# Patient Record
Sex: MALE | Race: WHITE | HISPANIC OR LATINO | Employment: FULL TIME | URBAN - METROPOLITAN AREA
[De-identification: names, ages, dates, MRNs, and addresses within clinical notes are randomized per-mention and may not be internally consistent; named-entity substitution may affect disease eponyms.]

---

## 2017-01-31 ENCOUNTER — TELEPHONE (OUTPATIENT)
Dept: FAMILY MEDICINE | Facility: CLINIC | Age: 27
End: 2017-01-31

## 2017-01-31 ENCOUNTER — OFFICE VISIT (OUTPATIENT)
Dept: FAMILY MEDICINE | Facility: CLINIC | Age: 27
End: 2017-01-31
Attending: FAMILY MEDICINE
Payer: COMMERCIAL

## 2017-01-31 VITALS
OXYGEN SATURATION: 96 % | WEIGHT: 271.19 LBS | HEIGHT: 73 IN | DIASTOLIC BLOOD PRESSURE: 82 MMHG | BODY MASS INDEX: 35.94 KG/M2 | SYSTOLIC BLOOD PRESSURE: 128 MMHG | HEART RATE: 90 BPM

## 2017-01-31 DIAGNOSIS — H65.112 ACUTE ALLERGIC OTITIS MEDIA OF LEFT EAR, RECURRENCE NOT SPECIFIED: ICD-10-CM

## 2017-01-31 DIAGNOSIS — J30.89 ALLERGIC RHINITIS DUE TO DUST MITE: ICD-10-CM

## 2017-01-31 DIAGNOSIS — J45.21 MILD INTERMITTENT ASTHMA WITH ACUTE EXACERBATION: Primary | ICD-10-CM

## 2017-01-31 PROCEDURE — 99214 OFFICE O/P EST MOD 30 MIN: CPT | Mod: S$GLB,,, | Performed by: FAMILY MEDICINE

## 2017-01-31 PROCEDURE — 99999 PR PBB SHADOW E&M-EST. PATIENT-LVL III: CPT | Mod: PBBFAC,,, | Performed by: FAMILY MEDICINE

## 2017-01-31 PROCEDURE — 1159F MED LIST DOCD IN RCRD: CPT | Mod: S$GLB,,, | Performed by: FAMILY MEDICINE

## 2017-01-31 RX ORDER — ALBUTEROL SULFATE 0.83 MG/ML
2.5 SOLUTION RESPIRATORY (INHALATION) EVERY 6 HOURS PRN
Qty: 75 ML | Refills: 2 | Status: SHIPPED | OUTPATIENT
Start: 2017-01-31 | End: 2018-01-31

## 2017-01-31 RX ORDER — METHYLPREDNISOLONE 4 MG/1
TABLET ORAL
Qty: 21 TABLET | Refills: 0 | Status: SHIPPED | OUTPATIENT
Start: 2017-01-31 | End: 2017-02-07 | Stop reason: ALTCHOICE

## 2017-01-31 RX ORDER — FLUTICASONE PROPIONATE 50 MCG
1 SPRAY, SUSPENSION (ML) NASAL DAILY
COMMUNITY

## 2017-01-31 RX ORDER — AMOXICILLIN AND CLAVULANATE POTASSIUM 875; 125 MG/1; MG/1
1 TABLET, FILM COATED ORAL 2 TIMES DAILY
Qty: 20 TABLET | Refills: 0 | Status: SHIPPED | OUTPATIENT
Start: 2017-01-31 | End: 2017-02-10

## 2017-01-31 RX ORDER — MONTELUKAST SODIUM 10 MG/1
10 TABLET ORAL NIGHTLY
Qty: 30 TABLET | Refills: 0 | Status: SHIPPED | OUTPATIENT
Start: 2017-01-31 | End: 2017-03-25 | Stop reason: SDUPTHER

## 2017-01-31 NOTE — TELEPHONE ENCOUNTER
The patient was referred to the Sleep Medicine Clinic, not the Sleep Lab.  A referral for a consultation was ordered, not a sleep study.  Please schedule patient to see a provider at the Sleep Clinic.

## 2017-01-31 NOTE — PROGRESS NOTES
Subjective:       Patient ID: Alan David Reyes is a 26 y.o. male.    Chief Complaint: Follow-up (discuss sleep clinic)    Asthma   He complains of chest tightness, cough and wheezing. There is no difficulty breathing, frequent throat clearing, hemoptysis, shortness of breath or sputum production. This is a recurrent problem. The current episode started 1 to 4 weeks ago. Asthma causes daytime symptoms frequently. Asthma causes nighttime symptoms frequently. The problem has been waxing and waning. The cough is productive of sputum, harsh and nocturnal. Associated symptoms include ear congestion, heartburn and postnasal drip. Pertinent negatives include no ear pain, fever, myalgias, rhinorrhea, sneezing, sore throat or trouble swallowing. His symptoms are aggravated by exercise. His symptoms are alleviated by beta-agonist. He reports moderate improvement on treatment. There are no known risk factors for lung disease. His past medical history is significant for asthma.      Patient Active Problem List   Diagnosis    Allergic rhinitis due to dust mite    Mild intermittent asthma       Current Outpatient Prescriptions:     acetaminophen (TYLENOL) 500 MG tablet, Take 2 tablets (1,000 mg total) by mouth every 8 (eight) hours as needed for Pain., Disp: 30 tablet, Rfl: 0    ALBUTEROL INHL, Inhale into the lungs., Disp: , Rfl:     fluticasone (FLONASE) 50 mcg/actuation nasal spray, 1 spray by Each Nare route once daily., Disp: , Rfl:     fluticasone-salmeterol 250-50 mcg/dose (ADVAIR) 250-50 mcg/dose diskus inhaler, Inhale 1 puff into the lungs 2 (two) times daily., Disp: , Rfl:     albuterol (PROVENTIL) 2.5 mg /3 mL (0.083 %) nebulizer solution, Take 3 mLs (2.5 mg total) by nebulization every 6 (six) hours as needed for Wheezing. Rescue, Disp: 75 mL, Rfl: 2    amoxicillin-clavulanate 875-125mg (AUGMENTIN) 875-125 mg per tablet, Take 1 tablet by mouth 2 (two) times daily., Disp: 20 tablet, Rfl: 0    methylPREDNISolone  (MEDROL DOSEPACK) 4 mg tablet, use as directed, Disp: 21 tablet, Rfl: 0    montelukast (SINGULAIR) 10 mg tablet, Take 1 tablet (10 mg total) by mouth every evening., Disp: 30 tablet, Rfl: 0    There have been no changes to the patient's past medical, surgical, family, or social histories.      Review of Systems   Constitutional: Negative for fever.   HENT: Positive for postnasal drip. Negative for ear pain, rhinorrhea, sneezing, sore throat and trouble swallowing.    Respiratory: Positive for cough and wheezing. Negative for hemoptysis, sputum production and shortness of breath.    Gastrointestinal: Positive for heartburn.   Musculoskeletal: Negative for myalgias.         He was referred to Sleep Clinic in the fall, but was never able to connect for an appointment.      Objective:      Physical Exam   Constitutional: He is oriented to person, place, and time. He appears well-developed and well-nourished. He is cooperative.   HENT:   Head: Normocephalic and atraumatic.   Right Ear: External ear normal. Tympanic membrane is erythematous. Tympanic membrane mobility is abnormal.   Left Ear: External ear normal. Tympanic membrane is erythematous and bulging. Tympanic membrane mobility is abnormal.   Nose: Mucosal edema and rhinorrhea present. Right sinus exhibits maxillary sinus tenderness and frontal sinus tenderness. Left sinus exhibits maxillary sinus tenderness and frontal sinus tenderness.   Mouth/Throat: Mucous membranes are normal. Posterior oropharyngeal erythema present. No oropharyngeal exudate.   Eyes: Conjunctivae are normal. No scleral icterus.   Neck: Neck supple. No JVD present. Carotid bruit is not present. No thyromegaly present.   Cardiovascular: Normal rate, regular rhythm, normal heart sounds and normal pulses.  Exam reveals no gallop and no friction rub.    No murmur heard.  Pulmonary/Chest: Effort normal. He has wheezes (mild end-expiraotry). He has no rhonchi. He has no rales.   Abdominal: Soft.  "Bowel sounds are normal. He exhibits no distension and no mass. There is no splenomegaly or hepatomegaly. There is no tenderness.   Musculoskeletal: Normal range of motion. He exhibits no edema or tenderness.   Lymphadenopathy:     He has no cervical adenopathy.     He has no axillary adenopathy.   Neurological: He is alert and oriented to person, place, and time. He has normal strength and normal reflexes. No cranial nerve deficit or sensory deficit. Coordination normal.   Skin: Skin is warm and dry.   Psychiatric: He has a normal mood and affect.   Vitals reviewed.        Patient's peak expiratory flow in the office today was 78.6% of predicted (500 LPM/636 LPM predicted).    Assessment:       1. Mild intermittent asthma with acute exacerbation    2. Allergic rhinitis due to dust mite    3. Acute allergic otitis media of left ear, recurrence not specified        Plan:       Provided patient with peak flow meter; set red, yellow, and green zones.  Discontinue loratadine with trial of montelukast.  For probable otitis media/sinusitis, we'll prescribe Augmentin.  Continue albuterol metered-dose inhaler and fluticasone/salmeterol Diskus inhaler.  Prescription for new mask/nebulizer supplies, as well as albuterol inhalation solution.  Medrol Dosepak.  Follow-up by phone/email in the next few days.    "This note will not be shared with the patient."  "

## 2017-01-31 NOTE — MR AVS SNAPSHOT
[address]  [address]  [facility] [address]  Phone: [phone]                  Alan David Reyes   2017 2:00 PM   Office Visit    Description:  Male : 1990   Provider:  Dameon Putnam Jr., MD   Department:  [facility]           Reason for Visit     Follow-up           Diagnoses this Visit        Comments    Mild intermittent asthma with acute exacerbation    -  Primary     Allergic rhinitis due to dust mite         Acute allergic otitis media of left ear, recurrence not specified                To Do List           Future Appointments        Provider Department Dept Phone    2017 1:00 PM Robert Tolbert MD [facility] - Otorhinolaryngology [phone]      Goals (5 Years of Data)     None       These Medications        Disp Refills Start End    montelukast (SINGULAIR) 10 mg tablet 30 tablet 0 2017 3/2/2017    Take 1 tablet (10 mg total) by mouth every evening. - Oral    Pharmacy: [facility]/pharmacy #[id number] - [place] [address] Ph #: [phone]       methylPREDNISolone (MEDROL DOSEPACK) 4 mg tablet 21 tablet 0 2017     use as directed    [id number]  [place] [address] Ph #: [phone]       albuterol (PROVENTIL) 2.5 mg /3 mL (0.083 %) nebulizer solution 75 mL 2 2017    Take 3 mLs (2.5 mg total) by nebulization every 6 (six) hours as needed for Wheezing. Rescue - Nebulization    Pharmacy: [facility]/pharmacy #[id number]  [place] [facility] Ph #: [phone]       amoxicillin-clavulanate 875-125mg (AUGMENTIN) 875-125 mg per tablet 20 tablet 0 2017 2/10/2017    Take 1 tablet by mouth 2 (two) times daily. - Oral    Pharmacy: [facility]/pharmacy #[id number] [place] [address] Ph #: [phone]         [facility]     [facility] On Call Nurse Care Line -  Assistance  Registered nurses in the [facility] On Call Center provide clinical  advisement, health education, appointment booking, and other advisory services.  Call for this free service at [phone].             Medications           Message regarding Medications     Verify the changes and/or additions to your medication regime listed below are the same as discussed with your clinician today.  If any of these changes or additions are incorrect, please notify your healthcare provider.        START taking these NEW medications        Refills    montelukast (SINGULAIR) 10 mg tablet 0    Sig: Take 1 tablet (10 mg total) by mouth every evening.    Class: Normal    Route: Oral    methylPREDNISolone (MEDROL DOSEPACK) 4 mg tablet 0    Sig: use as directed    Class: Normal    albuterol (PROVENTIL) 2.5 mg /3 mL (0.083 %) nebulizer solution 2    Sig: Take 3 mLs (2.5 mg total) by nebulization every 6 (six) hours as needed for Wheezing. Rescue    Class: Normal    Route: Nebulization    amoxicillin-clavulanate 875-125mg (AUGMENTIN) 875-125 mg per tablet 0    Sig: Take 1 tablet by mouth 2 (two) times daily.    Class: Normal    Route: Oral      STOP taking these medications     loratadine (CLARITIN) 10 mg tablet Take 10 mg by mouth once daily.           Verify that the below list of medications is an accurate representation of the medications you are currently taking.  If none reported, the list may be blank. If incorrect, please contact your healthcare provider. Carry this list with you in case of emergency.           Current Medications     acetaminophen (TYLENOL) 500 MG tablet Take 2 tablets (1,000 mg total) by mouth every 8 (eight) hours as needed for Pain.    ALBUTEROL INHL Inhale into the lungs.    fluticasone (FLONASE) 50 mcg/actuation nasal spray 1 spray by Each Nare route once daily.    fluticasone-salmeterol 250-50 mcg/dose (ADVAIR) 250-50 mcg/dose diskus inhaler Inhale 1 puff into the lungs 2 (two) times daily.    albuterol (PROVENTIL) 2.5 mg /3 mL (0.083 %) nebulizer solution Take 3 mLs (2.5  "mg total) by nebulization every 6 (six) hours as needed for Wheezing. Rescue    amoxicillin-clavulanate 875-125mg (AUGMENTIN) 875-125 mg per tablet Take 1 tablet by mouth 2 (two) times daily.    methylPREDNISolone (MEDROL DOSEPACK) 4 mg tablet use as directed    montelukast (SINGULAIR) 10 mg tablet Take 1 tablet (10 mg total) by mouth every evening.           Clinical Reference Information           Vital Signs - Last Recorded  Most recent update: 1/31/2017  2:28 PM by Dameon Putnam Jr., MD    BP Pulse Ht Wt SpO2 BMI    128/82 (BP Location: Right arm, Patient Position: Sitting, BP Method: Manual) 90 6' 0.5" (1.842 m) 123 kg (271 lb 3.2 oz) 96% 36.28 kg/m2      Blood Pressure          Most Recent Value    BP  128/82      Allergies as of 1/31/2017     No Known Allergies      Immunizations Administered on Date of Encounter - 1/31/2017     None      Orders Placed During Today's Visit      Normal Orders This Visit    NEBULIZER KIT (SUPPLIES) FOR HOME USE       "

## 2017-01-31 NOTE — TELEPHONE ENCOUNTER
----- Message from Darleen Agrawal sent at 1/31/2017  2:50 PM CST -----  Contact: Darleen  I contact the Authorization Dept. We see the referral but in order for the pt to have a Study you will have to enter an order.

## 2017-02-01 ENCOUNTER — OFFICE VISIT (OUTPATIENT)
Dept: SLEEP MEDICINE | Facility: CLINIC | Age: 27
End: 2017-02-01
Payer: COMMERCIAL

## 2017-02-01 VITALS
DIASTOLIC BLOOD PRESSURE: 101 MMHG | SYSTOLIC BLOOD PRESSURE: 157 MMHG | WEIGHT: 275.81 LBS | HEIGHT: 72 IN | HEART RATE: 97 BPM | BODY MASS INDEX: 37.36 KG/M2

## 2017-02-01 DIAGNOSIS — J30.9 CHRONIC ALLERGIC RHINITIS: ICD-10-CM

## 2017-02-01 DIAGNOSIS — G47.30 SLEEP APNEA, UNSPECIFIED TYPE: Primary | ICD-10-CM

## 2017-02-01 PROCEDURE — 99999 PR PBB SHADOW E&M-EST. PATIENT-LVL III: CPT | Mod: PBBFAC,,, | Performed by: PSYCHIATRY & NEUROLOGY

## 2017-02-01 PROCEDURE — 99204 OFFICE O/P NEW MOD 45 MIN: CPT | Mod: S$GLB,,, | Performed by: PSYCHIATRY & NEUROLOGY

## 2017-02-01 NOTE — LETTER
February 1, 2017      Dameon Putnam Jr., MD  [address]  [address]  [facility] [address]           [facility]  [address]  [facility] [address]  Phone: [phone]          Patient: Alan David Reyes   MR Number: [id number]   YOB: 1990   Date of Visit: 2/1/2017       Dear Dr. Dameon Putnam Jr.:    Thank you for referring Alan Reyes to me for evaluation. Attached you will find relevant portions of my assessment and plan of care.    If you have questions, please do not hesitate to call me. I look forward to following Alan Reyes along with you.    Sincerely,    Junior Orozco MD    Enclosure  CC:  No Recipients    If you would like to receive this communication electronically, please contact [email or web address] or [phone] to request more information on [facility] Link access.    For providers and/or their staff who would like to refer a patient to [facility], please contact us through our one-stop-shop provider referral line, [facility], at [phone].    If you feel you have received this communication in error or would no longer like to receive these types of communications, please e-mail [email or web address]
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
## 2017-02-01 NOTE — MR AVS SNAPSHOT
[facility]  [address]  [facility] [address]  Phone: [phone]                  Alan David Reyes   2017 10:40 AM   Office Visit    Description:  Male : 1990   Provider:  Junior Orozco MD   Department:  [facility]           Reason for Visit     Snoring     Fatigue           Diagnoses this Visit        Comments    Sleep apnea, unspecified type    -  Primary     Chronic allergic rhinitis                To Do List           Future Appointments        Provider Department Dept Phone    2017 1:00 PM Robert Tolbert MD [facility] - Otorhinolaryngology [phone]      Goals (5 Years of Data)     None      Follow-Up and Disposition     Return after sleep study.      [facility] On Call     [facility] On Call Nurse Care Line -  Assistance  Registered nurses in the [facility] On Call Center provide clinical advisement, health education, appointment booking, and other advisory services.  Call for this free service at [phone].             Medications           Message regarding Medications     Verify the changes and/or additions to your medication regime listed below are the same as discussed with your clinician today.  If any of these changes or additions are incorrect, please notify your healthcare provider.             Verify that the below list of medications is an accurate representation of the medications you are currently taking.  If none reported, the list may be blank. If incorrect, please contact your healthcare provider. Carry this list with you in case of emergency.           Current Medications     acetaminophen (TYLENOL) 500 MG tablet Take 2 tablets (1,000 mg total) by mouth every 8 (eight) hours as needed for Pain.    albuterol (PROVENTIL) 2.5 mg /3 mL (0.083 %) nebulizer solution Take 3 mLs (2.5 mg total) by nebulization every 6 (six) hours as needed for Wheezing. Rescue    ALBUTEROL INHL Inhale into the lungs.    amoxicillin-clavulanate  875-125mg (AUGMENTIN) 875-125 mg per tablet Take 1 tablet by mouth 2 (two) times daily.    fluticasone (FLONASE) 50 mcg/actuation nasal spray 1 spray by Each Nare route once daily.    fluticasone-salmeterol 250-50 mcg/dose (ADVAIR) 250-50 mcg/dose diskus inhaler Inhale 1 puff into the lungs 2 (two) times daily.    methylPREDNISolone (MEDROL DOSEPACK) 4 mg tablet use as directed    montelukast (SINGULAIR) 10 mg tablet Take 1 tablet (10 mg total) by mouth every evening.           Clinical Reference Information           Vital Signs - Last Recorded  Most recent update: 2/1/2017 11:15 AM by Myrna Peacock MA    BP Pulse Ht Wt BMI    (!) 157/101 97 6' (1.829 m) 125.1 kg (275 lb 12.7 oz) 37.4 kg/m2      Blood Pressure          Most Recent Value    BP  (!)  157/101      Allergies as of 2/1/2017     No Known Allergies      Immunizations Administered on Date of Encounter - 2/1/2017     None      Orders Placed During Today's Visit     Future Labs/Procedures Expected by Expires    Home Sleep Studies  As directed 2/1/2018      Instructions    Patient is candidate for home sleep testing.  Set up testing with [facility].  Scheduled by [facility]    1. Bárbara/Danny will contact you to schedule your sleep study [phone] (ext 1)  2. Sleep Lab is located in the [facility], take [place] elevator to 7th floor; You will see sign for [facility]

## 2017-02-01 NOTE — PATIENT INSTRUCTIONS
Patient is candidate for home sleep testing.  Set up testing with [facility].  Scheduled by [facility] [place], operated by [facility]    1. Bárbara/Danny will contact you to schedule your sleep study [phone] (ext 1)  2. Sleep Lab is located in the [facility], take [place] elevator to 7th floor; You will see sign for [facility]

## 2017-02-07 ENCOUNTER — OFFICE VISIT (OUTPATIENT)
Dept: OTOLARYNGOLOGY | Facility: CLINIC | Age: 27
End: 2017-02-07
Payer: COMMERCIAL

## 2017-02-07 VITALS
HEIGHT: 73 IN | WEIGHT: 270.5 LBS | SYSTOLIC BLOOD PRESSURE: 148 MMHG | HEART RATE: 84 BPM | DIASTOLIC BLOOD PRESSURE: 103 MMHG | BODY MASS INDEX: 35.85 KG/M2

## 2017-02-07 DIAGNOSIS — H69.93 EUSTACHIAN TUBE DYSFUNCTION, BILATERAL: Primary | ICD-10-CM

## 2017-02-07 DIAGNOSIS — J34.2 DEVIATED NASAL SEPTUM: ICD-10-CM

## 2017-02-07 DIAGNOSIS — J34.3 NASAL TURBINATE HYPERTROPHY: ICD-10-CM

## 2017-02-07 DIAGNOSIS — K21.9 GASTROESOPHAGEAL REFLUX DISEASE, ESOPHAGITIS PRESENCE NOT SPECIFIED: ICD-10-CM

## 2017-02-07 DIAGNOSIS — J35.2 ADENOID HYPERTROPHY: ICD-10-CM

## 2017-02-07 DIAGNOSIS — J30.89 PERENNIAL ALLERGIC RHINITIS, UNSPECIFIED ALLERGIC RHINITIS TRIGGER: ICD-10-CM

## 2017-02-07 PROCEDURE — 99204 OFFICE O/P NEW MOD 45 MIN: CPT | Mod: 25,S$GLB,, | Performed by: OTOLARYNGOLOGY

## 2017-02-07 PROCEDURE — 31231 NASAL ENDOSCOPY DX: CPT | Mod: S$GLB,,, | Performed by: OTOLARYNGOLOGY

## 2017-02-07 PROCEDURE — 99999 PR PBB SHADOW E&M-EST. PATIENT-LVL III: CPT | Mod: PBBFAC,,, | Performed by: OTOLARYNGOLOGY

## 2017-02-07 NOTE — PROCEDURES
Nasal/sinus endoscopy  Date/Time: 2/7/2017 3:30 PM  Performed by: KARLA ABREU  Authorized by: KARLA ABREU     Consent Done?:  Yes (Verbal)  Anesthesia:     Local anesthetic:  Lidocaine 4% and Edy-Synephrine 1/2%    Patient tolerance:  Patient tolerated the procedure well with no immediate complications  Nose:     Procedure Performed:  Nasal Endoscopy  External:      No external nasal deformity  Intranasal:      Mucosa no polyps     Mucosa ulcers not present     No mucosa lesions present     Enlarged turbinates     Septum gross deformity  Nasopharynx:      No mucosa lesions     Adenoids present     Posterior choanae patent     Eustachian tube not patent     Marked rightward septal deviation.  3+ adenoids with encroachment on posterior ET torus.  Mucopurulent exudate in nasopharynx.
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
## 2017-02-08 ENCOUNTER — TELEPHONE (OUTPATIENT)
Dept: SLEEP MEDICINE | Facility: OTHER | Age: 27
End: 2017-02-08

## 2017-02-09 ENCOUNTER — TELEPHONE (OUTPATIENT)
Dept: SLEEP MEDICINE | Facility: OTHER | Age: 27
End: 2017-02-09

## 2017-02-13 DIAGNOSIS — H69.93 ETD (EUSTACHIAN TUBE DYSFUNCTION), BILATERAL: Primary | ICD-10-CM

## 2017-02-13 DIAGNOSIS — K21.9 GASTROESOPHAGEAL REFLUX DISEASE, ESOPHAGITIS PRESENCE NOT SPECIFIED: ICD-10-CM

## 2017-02-13 DIAGNOSIS — J35.2 ADENOID HYPERTROPHY: ICD-10-CM

## 2017-02-13 DIAGNOSIS — J34.3 NASAL TURBINATE HYPERTROPHY: ICD-10-CM

## 2017-02-13 DIAGNOSIS — J30.89 PERENNIAL ALLERGIC RHINITIS, UNSPECIFIED ALLERGIC RHINITIS TRIGGER: ICD-10-CM

## 2017-02-13 DIAGNOSIS — J34.2 DEVIATED NASAL SEPTUM: ICD-10-CM

## 2017-03-08 ENCOUNTER — TELEPHONE (OUTPATIENT)
Dept: SLEEP MEDICINE | Facility: OTHER | Age: 27
End: 2017-03-08

## 2017-03-09 ENCOUNTER — HOSPITAL ENCOUNTER (OUTPATIENT)
Dept: SLEEP MEDICINE | Facility: OTHER | Age: 27
Discharge: HOME | End: 2017-03-09
Attending: PSYCHIATRY & NEUROLOGY
Payer: COMMERCIAL

## 2017-03-09 DIAGNOSIS — G47.33 OSA (OBSTRUCTIVE SLEEP APNEA): ICD-10-CM

## 2017-03-09 DIAGNOSIS — G47.30 SLEEP APNEA, UNSPECIFIED TYPE: ICD-10-CM

## 2017-03-09 PROCEDURE — 95800 SLP STDY UNATTENDED: CPT | Mod: 26,,, | Performed by: PSYCHIATRY & NEUROLOGY

## 2017-03-09 PROCEDURE — 95800 SLP STDY UNATTENDED: CPT

## 2017-03-20 ENCOUNTER — TELEPHONE (OUTPATIENT)
Dept: SLEEP MEDICINE | Facility: OTHER | Age: 27
End: 2017-03-20

## 2017-03-22 ENCOUNTER — OFFICE VISIT (OUTPATIENT)
Dept: SLEEP MEDICINE | Facility: CLINIC | Age: 27
End: 2017-03-22
Payer: COMMERCIAL

## 2017-03-22 VITALS
DIASTOLIC BLOOD PRESSURE: 90 MMHG | HEIGHT: 73 IN | BODY MASS INDEX: 35.76 KG/M2 | SYSTOLIC BLOOD PRESSURE: 142 MMHG | HEART RATE: 84 BPM | WEIGHT: 269.81 LBS

## 2017-03-22 DIAGNOSIS — G47.33 OSA (OBSTRUCTIVE SLEEP APNEA): Primary | ICD-10-CM

## 2017-03-22 DIAGNOSIS — R09.81 CHRONIC NASAL CONGESTION: ICD-10-CM

## 2017-03-22 PROCEDURE — 99999 PR PBB SHADOW E&M-EST. PATIENT-LVL III: CPT | Mod: PBBFAC,,, | Performed by: NURSE PRACTITIONER

## 2017-03-22 PROCEDURE — 99214 OFFICE O/P EST MOD 30 MIN: CPT | Mod: S$GLB,,, | Performed by: NURSE PRACTITIONER

## 2017-03-22 PROCEDURE — 1160F RVW MEDS BY RX/DR IN RCRD: CPT | Mod: S$GLB,,, | Performed by: NURSE PRACTITIONER

## 2017-03-22 RX ORDER — MONTELUKAST SODIUM 10 MG/1
10 TABLET ORAL NIGHTLY
COMMUNITY
End: 2017-03-26 | Stop reason: SDUPTHER
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
## 2017-03-22 NOTE — MR AVS SNAPSHOT
[facility]  [address]  [facility] [address]  Phone: [phone]                  Alan David Reyes   3/22/2017 8:40 AM   Office Visit    Description:  Male : 1990   Provider:  Viry Rutherford NP   Department:  [facility]           Diagnoses this Visit        Comments    ALEX (obstructive sleep apnea)    -  Primary            To Do List           Future Appointments        Provider Department Dept Phone    2017 2:30 PM Robert Tolbert MD [facility] - Otorhinolaryngology [phone]    2017 8:40 AM Viry Rutherford NP [facility] [phone]      Your Future Surgeries/Procedures     Mar 27, 2017   Surgery with Robert Tolbert MD   [facility]    [address] [facility] [address] [phone]              Goals (5 Years of Data)     None      Follow-Up and Disposition     Return in about 10 weeks (around 2017) for adherence monitoring after auto CPAP set up .      [facility]     [facility] -  Assistance  Registered nurses in the [facility] provide clinical advisement, health education, appointment booking, and other advisory services.  Call for this free service at [phone].             Medications           Message regarding Medications     Verify the changes and/or additions to your medication regime listed below are the same as discussed with your clinician today.  If any of these changes or additions are incorrect, please notify your healthcare provider.             Verify that the below list of medications is an accurate representation of the medications you are currently taking.  If none reported, the list may be blank. If incorrect, please contact your healthcare provider. Carry this list with you in case of emergency.           Current Medications     acetaminophen (TYLENOL) 500 MG tablet Take 2 tablets  "(1,000 mg total) by mouth every 8 (eight) hours as needed for Pain.    albuterol (PROVENTIL) 2.5 mg /3 mL (0.083 %) nebulizer solution Take 3 mLs (2.5 mg total) by nebulization every 6 (six) hours as needed for Wheezing. Rescue    ALBUTEROL INHL Inhale into the lungs.    fluticasone (FLONASE) 50 mcg/actuation nasal spray 1 spray by Each Nare route once daily.    fluticasone-salmeterol 250-50 mcg/dose (ADVAIR) 250-50 mcg/dose diskus inhaler Inhale 1 puff into the lungs 2 (two) times daily.    montelukast (SINGULAIR) 10 mg tablet Take 10 mg by mouth every evening.           Clinical Reference Information           Your Vitals Were     BP Pulse Height Weight BMI    142/90 84 6' 1" (1.854 m) 122.4 kg (269 lb 13.5 oz) 35.6 kg/m2      Blood Pressure          Most Recent Value    BP  (!)  142/90      Allergies as of 3/22/2017     No Known Allergies      Immunizations Administered on Date of Encounter - 3/22/2017     None      Orders Placed During Today's Visit      Normal Orders This Visit    CPAP FOR HOME USE       Language Assistance Services     ATTENTION: Language assistance services are available, free of charge. Please call [phone].      ATENCIÓN: Si arala paolo, tiene a serna disposición servicios gratuitos de asistencia lingüística. Llame al [phone].     [facility] Ý: N?u b?n nói Ti?ng Vi?t, có các d?ch v? h? tr? ngôn ng? mi?n phí dành cho b?n. G?i s? [phone].         [facility] complies with applicable Federal civil rights laws and does not discriminate on the basis of race, color, national origin, age, disability, or sex.        "

## 2017-03-24 ENCOUNTER — TELEPHONE (OUTPATIENT)
Dept: OTOLARYNGOLOGY | Facility: CLINIC | Age: 27
End: 2017-03-24

## 2017-03-24 NOTE — TELEPHONE ENCOUNTER
Left message along with contact information of surgery arrival time for Monday 03/27/17 with Dr. Tolbert of 10:30 AM.

## 2017-03-26 RX ORDER — MONTELUKAST SODIUM 10 MG/1
TABLET ORAL
Qty: 30 TABLET | Refills: 5 | Status: SHIPPED | OUTPATIENT
Start: 2017-03-26 | End: 2017-08-03 | Stop reason: SDUPTHER

## 2017-03-27 ENCOUNTER — ANESTHESIA (OUTPATIENT)
Dept: SURGERY | Facility: HOSPITAL | Age: 27
End: 2017-03-27
Payer: COMMERCIAL

## 2017-03-27 ENCOUNTER — SURGERY (OUTPATIENT)
Age: 27
End: 2017-03-27

## 2017-03-27 ENCOUNTER — ANESTHESIA EVENT (OUTPATIENT)
Dept: SURGERY | Facility: HOSPITAL | Age: 27
End: 2017-03-27
Payer: COMMERCIAL

## 2017-03-27 ENCOUNTER — HOSPITAL ENCOUNTER (OUTPATIENT)
Facility: HOSPITAL | Age: 27
Discharge: HOME | End: 2017-03-27
Attending: OTOLARYNGOLOGY | Admitting: OTOLARYNGOLOGY
Payer: COMMERCIAL

## 2017-03-27 VITALS
OXYGEN SATURATION: 99 % | HEART RATE: 98 BPM | SYSTOLIC BLOOD PRESSURE: 142 MMHG | DIASTOLIC BLOOD PRESSURE: 85 MMHG | TEMPERATURE: 99 F | HEIGHT: 73 IN | BODY MASS INDEX: 35.85 KG/M2 | RESPIRATION RATE: 18 BRPM | WEIGHT: 270.5 LBS

## 2017-03-27 DIAGNOSIS — J35.2 ADENOID HYPERTROPHY: Primary | ICD-10-CM

## 2017-03-27 DIAGNOSIS — G47.33 OSA (OBSTRUCTIVE SLEEP APNEA): ICD-10-CM

## 2017-03-27 DIAGNOSIS — R09.81 CHRONIC NASAL CONGESTION: ICD-10-CM

## 2017-03-27 PROCEDURE — 27000221 HC OXYGEN, UP TO 24 HOURS

## 2017-03-27 PROCEDURE — 71000033 HC RECOVERY, INTIAL HOUR: Performed by: OTOLARYNGOLOGY

## 2017-03-27 PROCEDURE — 36000708 HC OR TIME LEV III 1ST 15 MIN: Performed by: OTOLARYNGOLOGY

## 2017-03-27 PROCEDURE — 71000015 HC POSTOP RECOV 1ST HR: Performed by: OTOLARYNGOLOGY

## 2017-03-27 PROCEDURE — 71000039 HC RECOVERY, EACH ADD'L HOUR: Performed by: OTOLARYNGOLOGY

## 2017-03-27 PROCEDURE — D9220A PRA ANESTHESIA: Mod: ANES,,, | Performed by: ANESTHESIOLOGY

## 2017-03-27 PROCEDURE — D9220A PRA ANESTHESIA: Mod: CRNA,,, | Performed by: NURSE ANESTHETIST, CERTIFIED REGISTERED

## 2017-03-27 PROCEDURE — 63600175 PHARM REV CODE 636 W HCPCS: Performed by: ANESTHESIOLOGY

## 2017-03-27 PROCEDURE — 31237 NSL/SINS NDSC SURG BX POLYPC: CPT | Mod: 50,51,, | Performed by: OTOLARYNGOLOGY

## 2017-03-27 PROCEDURE — 25000003 PHARM REV CODE 250: Performed by: NURSE ANESTHETIST, CERTIFIED REGISTERED

## 2017-03-27 PROCEDURE — 63600175 PHARM REV CODE 636 W HCPCS: Performed by: NURSE ANESTHETIST, CERTIFIED REGISTERED

## 2017-03-27 PROCEDURE — 25000003 PHARM REV CODE 250: Performed by: OTOLARYNGOLOGY

## 2017-03-27 PROCEDURE — 27201423 OPTIME MED/SURG SUP & DEVICES STERILE SUPPLY: Performed by: OTOLARYNGOLOGY

## 2017-03-27 PROCEDURE — 42831 REMOVAL OF ADENOIDS: CPT | Mod: 51,,, | Performed by: OTOLARYNGOLOGY

## 2017-03-27 PROCEDURE — 25000003 PHARM REV CODE 250: Performed by: STUDENT IN AN ORGANIZED HEALTH CARE EDUCATION/TRAINING PROGRAM

## 2017-03-27 PROCEDURE — 30520 REPAIR OF NASAL SEPTUM: CPT | Mod: ,,, | Performed by: OTOLARYNGOLOGY

## 2017-03-27 PROCEDURE — 63600175 PHARM REV CODE 636 W HCPCS: Performed by: OTOLARYNGOLOGY

## 2017-03-27 PROCEDURE — 36000709 HC OR TIME LEV III EA ADD 15 MIN: Performed by: OTOLARYNGOLOGY

## 2017-03-27 PROCEDURE — 37000008 HC ANESTHESIA 1ST 15 MINUTES: Performed by: OTOLARYNGOLOGY

## 2017-03-27 PROCEDURE — 37000009 HC ANESTHESIA EA ADD 15 MINS: Performed by: OTOLARYNGOLOGY

## 2017-03-27 PROCEDURE — 71000016 HC POSTOP RECOV ADDL HR: Performed by: OTOLARYNGOLOGY

## 2017-03-27 PROCEDURE — 30140 RESECT INFERIOR TURBINATE: CPT | Mod: 50,51,, | Performed by: OTOLARYNGOLOGY

## 2017-03-27 RX ORDER — ONDANSETRON 2 MG/ML
4 INJECTION INTRAMUSCULAR; INTRAVENOUS ONCE AS NEEDED
Status: DISCONTINUED | OUTPATIENT
Start: 2017-03-27 | End: 2017-03-27 | Stop reason: HOSPADM

## 2017-03-27 RX ORDER — LIDOCAINE HCL/PF 100 MG/5ML
SYRINGE (ML) INTRAVENOUS
Status: DISCONTINUED | OUTPATIENT
Start: 2017-03-27 | End: 2017-03-27

## 2017-03-27 RX ORDER — LIDOCAINE HCL/EPINEPHRINE/PF 2%-1:200K
VIAL (ML) INJECTION
Status: DISCONTINUED | OUTPATIENT
Start: 2017-03-27 | End: 2017-03-27 | Stop reason: HOSPADM

## 2017-03-27 RX ORDER — ACETAMINOPHEN 10 MG/ML
INJECTION, SOLUTION INTRAVENOUS
Status: DISCONTINUED | OUTPATIENT
Start: 2017-03-27 | End: 2017-03-27

## 2017-03-27 RX ORDER — OXYCODONE AND ACETAMINOPHEN 5; 325 MG/1; MG/1
1 TABLET ORAL EVERY 4 HOURS PRN
Status: DISCONTINUED | OUTPATIENT
Start: 2017-03-27 | End: 2017-03-27 | Stop reason: HOSPADM

## 2017-03-27 RX ORDER — HYDROMORPHONE HYDROCHLORIDE 1 MG/ML
0.2 INJECTION, SOLUTION INTRAMUSCULAR; INTRAVENOUS; SUBCUTANEOUS EVERY 5 MIN PRN
Status: DISCONTINUED | OUTPATIENT
Start: 2017-03-27 | End: 2017-03-27 | Stop reason: HOSPADM

## 2017-03-27 RX ORDER — LIDOCAINE HYDROCHLORIDE 10 MG/ML
1 INJECTION, SOLUTION EPIDURAL; INFILTRATION; INTRACAUDAL; PERINEURAL ONCE
Status: DISCONTINUED | OUTPATIENT
Start: 2017-03-27 | End: 2017-03-27 | Stop reason: HOSPADM

## 2017-03-27 RX ORDER — DEXAMETHASONE SODIUM PHOSPHATE 4 MG/ML
INJECTION, SOLUTION INTRA-ARTICULAR; INTRALESIONAL; INTRAMUSCULAR; INTRAVENOUS; SOFT TISSUE
Status: DISCONTINUED | OUTPATIENT
Start: 2017-03-27 | End: 2017-03-27

## 2017-03-27 RX ORDER — PROPOFOL 10 MG/ML
VIAL (ML) INTRAVENOUS
Status: DISCONTINUED | OUTPATIENT
Start: 2017-03-27 | End: 2017-03-27

## 2017-03-27 RX ORDER — EPINEPHRINE 1 MG/ML
INJECTION, SOLUTION INTRACARDIAC; INTRAMUSCULAR; INTRAVENOUS; SUBCUTANEOUS
Status: DISCONTINUED | OUTPATIENT
Start: 2017-03-27 | End: 2017-03-27 | Stop reason: HOSPADM

## 2017-03-27 RX ORDER — KETAMINE HYDROCHLORIDE 100 MG/ML
INJECTION, SOLUTION INTRAMUSCULAR; INTRAVENOUS
Status: DISCONTINUED | OUTPATIENT
Start: 2017-03-27 | End: 2017-03-27

## 2017-03-27 RX ORDER — NEOSTIGMINE METHYLSULFATE 1 MG/ML
INJECTION, SOLUTION INTRAVENOUS
Status: DISCONTINUED | OUTPATIENT
Start: 2017-03-27 | End: 2017-03-27

## 2017-03-27 RX ORDER — ONDANSETRON 2 MG/ML
INJECTION INTRAMUSCULAR; INTRAVENOUS
Status: DISCONTINUED | OUTPATIENT
Start: 2017-03-27 | End: 2017-03-27

## 2017-03-27 RX ORDER — CEFAZOLIN SODIUM 1 G/3ML
INJECTION, POWDER, FOR SOLUTION INTRAMUSCULAR; INTRAVENOUS
Status: DISCONTINUED | OUTPATIENT
Start: 2017-03-27 | End: 2017-03-27

## 2017-03-27 RX ORDER — MIDAZOLAM HYDROCHLORIDE 1 MG/ML
INJECTION INTRAMUSCULAR; INTRAVENOUS
Status: DISCONTINUED | OUTPATIENT
Start: 2017-03-27 | End: 2017-03-27

## 2017-03-27 RX ORDER — SODIUM CHLORIDE 9 MG/ML
INJECTION, SOLUTION INTRAVENOUS CONTINUOUS PRN
Status: DISCONTINUED | OUTPATIENT
Start: 2017-03-27 | End: 2017-03-27

## 2017-03-27 RX ORDER — FENTANYL CITRATE 50 UG/ML
INJECTION, SOLUTION INTRAMUSCULAR; INTRAVENOUS
Status: DISCONTINUED | OUTPATIENT
Start: 2017-03-27 | End: 2017-03-27

## 2017-03-27 RX ORDER — OXYCODONE AND ACETAMINOPHEN 5; 325 MG/1; MG/1
1 TABLET ORAL EVERY 6 HOURS PRN
Qty: 30 TABLET | Refills: 0 | Status: SHIPPED | OUTPATIENT
Start: 2017-03-27 | End: 2017-04-04 | Stop reason: SDUPTHER

## 2017-03-27 RX ORDER — MUPIROCIN 20 MG/G
OINTMENT TOPICAL
Status: DISCONTINUED | OUTPATIENT
Start: 2017-03-27 | End: 2017-03-27 | Stop reason: HOSPADM

## 2017-03-27 RX ORDER — DEXAMETHASONE SODIUM PHOSPHATE 4 MG/ML
12 INJECTION, SOLUTION INTRA-ARTICULAR; INTRALESIONAL; INTRAMUSCULAR; INTRAVENOUS; SOFT TISSUE
Status: DISCONTINUED | OUTPATIENT
Start: 2017-03-27 | End: 2017-03-27 | Stop reason: HOSPADM

## 2017-03-27 RX ORDER — CEPHALEXIN 500 MG/1
500 CAPSULE ORAL EVERY 8 HOURS
Qty: 40 CAPSULE | Refills: 0 | Status: SHIPPED | OUTPATIENT
Start: 2017-03-27 | End: 2017-04-07

## 2017-03-27 RX ORDER — GLYCOPYRROLATE 0.2 MG/ML
INJECTION INTRAMUSCULAR; INTRAVENOUS
Status: DISCONTINUED | OUTPATIENT
Start: 2017-03-27 | End: 2017-03-27

## 2017-03-27 RX ORDER — ROCURONIUM BROMIDE 10 MG/ML
INJECTION, SOLUTION INTRAVENOUS
Status: DISCONTINUED | OUTPATIENT
Start: 2017-03-27 | End: 2017-03-27

## 2017-03-27 RX ADMIN — DEXMEDETOMIDINE HYDROCHLORIDE 8 MCG: 100 INJECTION, SOLUTION, CONCENTRATE INTRAVENOUS at 02:03

## 2017-03-27 RX ADMIN — DEXMEDETOMIDINE HYDROCHLORIDE 8 MCG: 100 INJECTION, SOLUTION, CONCENTRATE INTRAVENOUS at 03:03

## 2017-03-27 RX ADMIN — COCAINE HYDROCHLORIDE 4 ML: 40 SOLUTION TOPICAL at 02:03

## 2017-03-27 RX ADMIN — DEXMEDETOMIDINE HYDROCHLORIDE 8 MCG: 100 INJECTION, SOLUTION, CONCENTRATE INTRAVENOUS at 04:03

## 2017-03-27 RX ADMIN — PROPOFOL 200 MG: 10 INJECTION, EMULSION INTRAVENOUS at 02:03

## 2017-03-27 RX ADMIN — ONDANSETRON 4 MG: 2 INJECTION INTRAMUSCULAR; INTRAVENOUS at 04:03

## 2017-03-27 RX ADMIN — MIDAZOLAM HYDROCHLORIDE 2 MG: 1 INJECTION, SOLUTION INTRAMUSCULAR; INTRAVENOUS at 02:03

## 2017-03-27 RX ADMIN — CEFAZOLIN 2 G: 1 INJECTION, POWDER, FOR SOLUTION INTRAVENOUS at 02:03

## 2017-03-27 RX ADMIN — FENTANYL CITRATE 50 MCG: 50 INJECTION, SOLUTION INTRAMUSCULAR; INTRAVENOUS at 02:03

## 2017-03-27 RX ADMIN — LIDOCAINE HYDROCHLORIDE AND EPINEPHRINE 15 ML: 20; 5 INJECTION, SOLUTION EPIDURAL; INFILTRATION; INTRACAUDAL; PERINEURAL at 02:03

## 2017-03-27 RX ADMIN — SODIUM CHLORIDE: 0.9 INJECTION, SOLUTION INTRAVENOUS at 01:03

## 2017-03-27 RX ADMIN — GLYCOPYRROLATE 0.6 MG: 0.2 INJECTION, SOLUTION INTRAMUSCULAR; INTRAVENOUS at 04:03

## 2017-03-27 RX ADMIN — HYDROMORPHONE HYDROCHLORIDE 0.2 MG: 1 INJECTION, SOLUTION INTRAMUSCULAR; INTRAVENOUS; SUBCUTANEOUS at 07:03

## 2017-03-27 RX ADMIN — KETAMINE HYDROCHLORIDE 25 MG: 100 INJECTION, SOLUTION, CONCENTRATE INTRAMUSCULAR; INTRAVENOUS at 02:03

## 2017-03-27 RX ADMIN — DEXAMETHASONE SODIUM PHOSPHATE 12 MG: 4 INJECTION, SOLUTION INTRAMUSCULAR; INTRAVENOUS at 02:03

## 2017-03-27 RX ADMIN — PROPOFOL 50 MG: 10 INJECTION, EMULSION INTRAVENOUS at 02:03

## 2017-03-27 RX ADMIN — GLYCOPYRROLATE 0.2 MG: 0.2 INJECTION, SOLUTION INTRAMUSCULAR; INTRAVENOUS at 02:03

## 2017-03-27 RX ADMIN — ROCURONIUM BROMIDE 10 MG: 10 INJECTION, SOLUTION INTRAVENOUS at 02:03

## 2017-03-27 RX ADMIN — THROMBIN, TOPICAL (BOVINE) 15000 UNITS: KIT at 02:03

## 2017-03-27 RX ADMIN — LIDOCAINE HYDROCHLORIDE 100 MG: 20 INJECTION, SOLUTION INTRAVENOUS at 02:03

## 2017-03-27 RX ADMIN — EPINEPHRINE 2 MG: 1 INJECTION, SOLUTION INTRAMUSCULAR; SUBCUTANEOUS at 02:03

## 2017-03-27 RX ADMIN — DEXMEDETOMIDINE HYDROCHLORIDE 4 MCG: 100 INJECTION, SOLUTION, CONCENTRATE INTRAVENOUS at 02:03

## 2017-03-27 RX ADMIN — MUPIROCIN 1 TUBE: 20 OINTMENT TOPICAL at 02:03

## 2017-03-27 RX ADMIN — OXYCODONE HYDROCHLORIDE AND ACETAMINOPHEN 1 TABLET: 5; 325 TABLET ORAL at 05:03

## 2017-03-27 RX ADMIN — KETAMINE HYDROCHLORIDE 20 MG: 100 INJECTION, SOLUTION, CONCENTRATE INTRAMUSCULAR; INTRAVENOUS at 03:03

## 2017-03-27 RX ADMIN — HYDROMORPHONE HYDROCHLORIDE 0.2 MG: 1 INJECTION, SOLUTION INTRAMUSCULAR; INTRAVENOUS; SUBCUTANEOUS at 06:03

## 2017-03-27 RX ADMIN — ROCURONIUM BROMIDE 40 MG: 10 INJECTION, SOLUTION INTRAVENOUS at 02:03

## 2017-03-27 RX ADMIN — FENTANYL CITRATE 100 MCG: 50 INJECTION, SOLUTION INTRAMUSCULAR; INTRAVENOUS at 02:03

## 2017-03-27 RX ADMIN — NEOSTIGMINE METHYLSULFATE 4 MG: 1 INJECTION INTRAVENOUS at 04:03

## 2017-03-27 RX ADMIN — ACETAMINOPHEN 1000 MG: 10 INJECTION, SOLUTION INTRAVENOUS at 04:03

## 2017-03-27 RX ADMIN — SODIUM CHLORIDE, SODIUM GLUCONATE, SODIUM ACETATE, POTASSIUM CHLORIDE, MAGNESIUM CHLORIDE, SODIUM PHOSPHATE, DIBASIC, AND POTASSIUM PHOSPHATE: .53; .5; .37; .037; .03; .012; .00082 INJECTION, SOLUTION INTRAVENOUS at 04:03

## 2017-03-27 RX ADMIN — ROCURONIUM BROMIDE 5 MG: 10 INJECTION, SOLUTION INTRAVENOUS at 03:03

## 2017-03-27 NOTE — BRIEF OP NOTE
[facility]  Brief Operative Note     SUMMARY     Surgery Date: 3/27/2017     Surgeon(s) and Role:     * David Stock MD - Resident - Assisting     * Robert Tolbert MD - Primary     * Robert Tolbert MD - Primary        Pre-op Diagnosis:  Deviated nasal septum [J34.2]  Adenoid hypertrophy [J35.2]  Nasal turbinate hypertrophy [J34.3]  ETD (eustachian tube dysfunction), bilateral [H69.83]  Gastroesophageal reflux disease, esophagitis presence not specified [K21.9]  Perennial allergic rhinitis, unspecified allergic rhinitis trigger [J30.89]    Post-op Diagnosis:  Post-Op Diagnosis Codes:     * Deviated nasal septum [J34.2]     * Adenoid hypertrophy [J35.2]     * Nasal turbinate hypertrophy [J34.3]     * ETD (eustachian tube dysfunction), bilateral [H69.83]     * Gastroesophageal reflux disease, esophagitis presence not specified [K21.9]     * Perennial allergic rhinitis, unspecified allergic rhinitis trigger [J30.89]    Procedure(s) (LRB):  SEPTOPLASTY (Bilateral)  REDUCTION-TURBINATE (Bilateral)  SINUS SURGERY FUNCTIONAL ENDOSCOPIC (Bilateral)  ADENOIDECTOMY (Bilateral)    Anesthesia: General    Description of the findings of the procedure: septoplasty, inferior turbinate reduction, denoidectomy    Findings/Key Components: see op report    Estimated Blood Loss: 65 ml         Specimens:   Specimen     None          Discharge Note    SUMMARY     Admit Date: 3/27/2017    Discharge Date and Time:  03/27/2017 4:44 PM    Hospital Course (synopsis of major diagnoses, care, treatment, and services provided during the course of the hospital stay): Pt has outpatient surgery and was awakened in the recovery room and discharged home in a stable condition. pts pain was controlled and tolerating a diet         Final Diagnosis: Post-Op Diagnosis Codes:     * Deviated nasal septum [J34.2]     * Adenoid hypertrophy [J35.2]     * Nasal turbinate hypertrophy [J34.3]     * ETD (eustachian tube  dysfunction), bilateral [H69.83]     * Gastroesophageal reflux disease, esophagitis presence not specified [K21.9]     * Perennial allergic rhinitis, unspecified allergic rhinitis trigger [J30.89]    Disposition: Home or Self Care    Follow Up/Patient Instructions:     Medications:  Reconciled Home Medications:   Current Discharge Medication List      START taking these medications    Details   cephALEXin (KEFLEX) 500 MG capsule Take 1 capsule (500 mg total) by mouth every 8 (eight) hours.  Qty: 40 capsule, Refills: 0      oxycodone-acetaminophen (PERCOCET) 5-325 mg per tablet Take 1 tablet by mouth every 6 (six) hours as needed for Pain.  Qty: 30 tablet, Refills: 0         CONTINUE these medications which have NOT CHANGED    Details   ALBUTEROL INHL Inhale into the lungs.      montelukast (SINGULAIR) 10 mg tablet TAKE 1 TABLET (10 MG TOTAL) BY MOUTH EVERY EVENING.  Qty: 30 tablet, Refills: 5      albuterol (PROVENTIL) 2.5 mg /3 mL (0.083 %) nebulizer solution Take 3 mLs (2.5 mg total) by nebulization every 6 (six) hours as needed for Wheezing. Rescue  Qty: 75 mL, Refills: 2      fluticasone (FLONASE) 50 mcg/actuation nasal spray 1 spray by Each Nare route once daily.      fluticasone-salmeterol 250-50 mcg/dose (ADVAIR) 250-50 mcg/dose diskus inhaler Inhale 1 puff into the lungs 2 (two) times daily.         STOP taking these medications       acetaminophen (TYLENOL) 500 MG tablet Comments:   Reason for Stopping:               Discharge Procedure Orders  Diet general     Lifting restrictions     Weight bearing restrictions (specify)     Other restrictions (specify):   Order Comments: INSTRUCTIONS TO FOLLOW AFTER SINUS SURGERY  DR. McCOUL  [facility]      Your first office visit with Dr. Tolbert after surgery should have been already scheduled.  If you dont know when it is, call Dr. John Garsia at [phone].    ACTIVITY:    Sleep on your back with the head of the bead elevated, up on 2-3 pillows, or in  a recliner for the first 3 to 5 days. This will help with swelling.     After surgery you may have a lot of nasal drainage. This is normal. Do not wipe, touch, or dab your nose. You may breathe through your nose if youre able but avoid inhaling forcibly. Let all drainage fall on your mustache dressing and change it as needed.    You may shower 24 hours after surgery.    If you use CPAP or BiPAP to sleep at night, you should wait at least 48 hours before resuming use.  Dr. Tolbert will advise you when it is safe to do this.    DRESSINGS:    Change the mustache dressing under your nose as needed. (If unsure what this dressing is or how to do this, ask your doctor or nurse before you leave the clinic/hospital.) You may have pinkish-red drainage for 2-3 days.    In certain cases you may have gauze packing inside your nostrils.  If so, these will turn from white to red from the drainage. This is normal so do not be alarmed. Do not touch or pull at the packing. The packing will be removed by your doctor at your first post-op visit.     You may also have a dissolvable stent or dissolvable sponge placed into the sinuses during surgery.  These usually do not need to be removed unless you are told otherwise by Dr. Tolbert.  You may notice small fragments of these items come out of your nose in the weeks following surgery.    MEDICATIONS:  After surgery, you are generally sent home with prescription for an antibiotic, a pain medication, and an anti-nausea medication.     Start your antibiotics when you get home from surgery and take them until they are all gone.  It is best to take them with food to prevent an upset stomach.    Most people need prescription pain medication for the first few days after surgery.  If you find that this is not necessary, you may use over-the-counter Tylenol (Acetaminophen) instead.    Avoid Aspirin, Motrin (Ibuprofen), Advil, Aleve and Vitamin E for 1 week before surgery and 1 week after surgery.  There are many other medications to avoid as well due to the fact they act as blood thinners.      Some people have problems with bowel movements after surgery. If you have NOT had a bowel movement 3-5 days after surgery, go to your local pharmacy and purchase an over the counter stool softener such as COLACE. You can also ask the pharmacist for his or her recommendation. If you still do not have a bowel movement after starting the softener, please call Dr. Lopez office.    You will need these over-the-counter medications after surgery:    Saline Sinus Rinse ([employer] brand):  You will use this to rinse out your nose and sinuses after surgery.  Begin doing this the day after surgery, unless instructed otherwise by Dr. Tolbert.  You should do this 2 times a day, following the instructions on the box.    Afrin (regular strength): Only use if you have nasal congestion or bleeding. Use 2 times per day for 3 days, stop for 1 day, continue 2 times per day for 3 days, then stop completely.  NOTE:  You may not need to do this at all.      RESTRICTED ACTIVITIES AFTER SURGERY:    Do NOT blow your nose for 2 weeks. If you have to sneeze or cough, do so with your mouth open.   AVOID all heavy lifting, straining or bending for 2 weeks.   AVOID any sexual activity for 2 weeks after surgery.  AVOID semi-contact sports or vigorous exercising for 3-4 weeks. Dr. Tolbert will let you know when you are cleared to resume exercise.  No Swimming for 3-4 weeks.  No Flying for 2 weeks.    Do NOT operate a motor vehicle or any type of heavy machinery within 24 hours of taking pain medication.  DO NOT smoke or be around smokers.  AVOID irritating substances such as dust, chalk, harsh chemicals, and allergic triggers.    DIET:    Avoid hot and spicy foods, or salty soups for 1 week after surgery.  Begin with bland foods the day after surgery and advance to your regular diet as tolerated.  It is not necessary to take only soft food unless you  are recovering from tonsil surgery.  Drink plenty of fluids (water is best).   Avoid alcoholic and caffeinated beverages for 1 week after surgery.     Call MD for:  increased confusion or weakness     Call MD for:  persistent dizziness, light-headedness, or visual disturbances     Call MD for:  worsening rash     Call MD for:  severe persistent headache     Call MD for:  difficulty breathing or increased cough     Call MD for:  severe uncontrolled pain     Call MD for:  redness, tenderness, or signs of infection (pain, swelling, redness, odor or green/yellow discharge around incision site)     Call MD for:  persistent nausea and vomiting or diarrhea     Call MD for:  temperature >100.4     Change dressing (specify)   Order Comments: Change moustache dressing as needed for bleeding       Follow-up Information     Follow up with Robert Tolbert MD On 4/4/2017.    Specialty:  Otolaryngology    Why:  For wound re-check    Contact information:    [address]  [facility] [address]  [phone]
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
## 2017-03-27 NOTE — PROGRESS NOTES
Notified Dr. Manley that patient is complaining of 7/10 pain upon arriving from PACU and I am unable to reach  to page Surgical Resident.  Dr. Manley stated that she would put in order for Dialudid IVP.  Will continue to monitor patient.

## 2017-03-27 NOTE — ANESTHESIA PREPROCEDURE EVALUATION
"                                                                                                             03/27/2017  Alan David Reyes is a 26 y.o., male.    [facility] Anesthesia Evaluation    I have reviewed the Patient Summary Reports.    I have reviewed the Nursing Notes.   I have reviewed the Medications.     Review of Systems  Anesthesia Hx:  No problems with previous Anesthesia Denies Hx of Anesthetic complications Pt states he "wakes up during anesthesia" previous anesthetics include local anesthesia for a small neck mass procedure and anesthesia for wisdom tooth removal with OMFS. History of prior surgery of interest to airway management or planning: Denies Family Hx of Anesthesia complications.   Denies Personal Hx of Anesthesia complications.   EENT/Dental:   Chronic sinusitis   Cardiovascular:  Cardiovascular Normal  Denies MI.      Pulmonary:   Asthma Sleep Apnea, CPAP    Renal/:  Renal/ Normal     Hepatic/GI:  Hepatic/GI Normal    Neurological:  Neurology Normal  Denies CVA. Denies Seizures.        Physical Exam  General:  Obesity    Airway/Jaw/Neck:  Airway Findings: Mouth Opening: Normal Tongue: Normal  General Airway Assessment: Adult  Mallampati: I  Improves to I with phonation.  TM Distance: Normal, at least 6 cm  Jaw/Neck Findings:  Micrognathia: Negative Neck ROM: Normal ROM      Dental:  Dental Findings: In tact   Chest/Lungs:  Chest/Lungs Findings: Clear to auscultation, Normal Respiratory Rate     Heart/Vascular:  Heart Findings: Rate: Normal  Rhythm: Regular Rhythm  Sounds: Normal  Heart murmur: negative    Abdomen:  Abdomen Findings:  Normal, Nontender, Soft       Mental Status:  Mental Status Findings:  Cooperative, Alert and Oriented         Anesthesia Plan  Type of Anesthesia, risks & benefits discussed:  Anesthesia Type:  MAC, general  Patient's Preference:   Intra-op Monitoring Plan:   Intra-op Monitoring Plan Comments:   Post Op Pain Control Plan:   Post Op Pain Control Plan Comments: "   Induction:   IV  Beta Blocker:  Patient is not currently on a Beta-Blocker (No further documentation required).       Informed Consent: Patient understands risks and agrees with Anesthesia plan.  Questions answered. Anesthesia consent signed with patient.  ASA Score: 2     Day of Surgery Review of History & Physical:    H&P update referred to the surgeon.         Ready For Surgery From Anesthesia Perspective.

## 2017-03-27 NOTE — ANESTHESIA RELEASE NOTE
"Anesthesia Release from PACU Note    Patient: Alan David Reyes    Procedure(s) Performed: Procedure(s) (LRB):  SEPTOPLASTY (Bilateral)  REDUCTION-TURBINATE (Bilateral)  SINUS SURGERY FUNCTIONAL ENDOSCOPIC (Bilateral)  ADENOIDECTOMY (Bilateral)    Anesthesia type: general    Post pain: Adequate analgesia    Post assessment: no apparent anesthetic complications, tolerated procedure well and no evidence of recall    Last Vitals:   Visit Vitals    BP (!) 137/93    Pulse 81    Temp 36.5 °C (97.7 °F) (Axillary)    Resp 18    Ht 6' 1" (1.854 m)    Wt 122.7 kg (270 lb 8.1 oz)    SpO2 96%    BMI 35.69 kg/m2       Post vital signs: stable    Level of consciousness: awake, alert  and oriented    Nausea/Vomiting: no nausea/no vomiting    Complications: none    Airway Patency: patent    Respiratory: unassisted    Cardiovascular: stable and blood pressure at baseline    Hydration: euvolemic  "

## 2017-03-27 NOTE — TRANSFER OF CARE
"Anesthesia Transfer of Care Note    Patient: Alan David Reyes    Procedure(s) Performed: Procedure(s) (LRB):  SEPTOPLASTY (Bilateral)  REDUCTION-TURBINATE (Bilateral)  SINUS SURGERY FUNCTIONAL ENDOSCOPIC (Bilateral)  ADENOIDECTOMY (Bilateral)    Patient location: PACU    Anesthesia Type: general    Transport from OR: Transported from OR on 6-10 L/min O2 by face mask with adequate spontaneous ventilation    Post pain: adequate analgesia    Post assessment: tolerated procedure well and no apparent anesthetic complications    Post vital signs: stable    Level of consciousness: awake and alert    Nausea/Vomiting: no nausea/vomiting    Complications: none          Last vitals:   Visit Vitals    BP (!) 184/93 (BP Location: Right arm, Patient Position: Lying, BP Method: Automatic)    Pulse 97    Temp 36.5 °C (97.7 °F) (Axillary)    Resp (!) 24    Ht 6' 1" (1.854 m)    Wt 122.7 kg (270 lb 8.1 oz)    SpO2 99%    BMI 35.69 kg/m2     "

## 2017-03-27 NOTE — OP NOTE
DATE OF OPERATION: 3/27/2017    SURGEON:  Robert Tolbert MD     ASSISTANT SURGEON:  David Stock MD     OPERATION:     1. Endoscopic septoplasty.  2. Bilateral inferior turbinate reduction with submucosal resection.  3. Nasal endoscopy with ablation of adenoid vegetations.  4. Adenoidectomy.     PREOPERATIVE DIAGNOSIS:      1. Deviated nasal septum.  2. Hypertrophic turbinates.  3. Obstructive sleep apnea.   4. Adenoidectomy.  5. Eustachian tube dysfunction.     POSTOPERATIVE DIAGNOSIS:      1. Deviated nasal septum.  2. Hypertrophic turbinates.  3. Obstructive sleep apnea.   4. Adenoidectomy.  5. Eustachian tube dysfunction.     ANESTHESIA: General.     COMPLICATIONS: None.     ESTIMATED BLOOD LOSS: 30 mL     SPECIMEN: None.     WOUND EXPECTANCY: Clean-contaminated.     FINDINGS: Rightward septal deviation.  Compound inferior turbinate hypertrophy.  3+ adenoid hypertrophy.  Adenoid vegetations involving posterior cushion of bilateral Eustachian tubes.     INDICATIONS: Anatomic nasal obstruction, not improved with medical therapy.     I discussed the risks, benefits and alternatives of surgical correction of the septal deviation and turbinate hypertrophy with the patient as well as the expected postoperative course. I gave him the opportunity to ask questions and I answered all of them. On the morning of surgery I again met with the patient and reviewed the indications for surgery and he consented to proceed.     DESCRIPTION OF PROCEDURE: The patient was brought to the operating room and placed supine on the operating table. The patient was placed under general anesthesia and intubated. The patient was positioned with a donut under the head.  Cottonoid pledgets soaked with 4% cocaine was placed into the nasal cavity bilaterally for mucosal decongestion. The mucosa of the nasal septum was injected with 1% lidocaine with 1:100,000 parts epinephrine.  A time-out was performed to confirm the proper patient, site  and procedure. The patient was prepped and draped in the usual fashion.     Surgery began with performance of submucous resection of the nasal septum. This began with additional injections, assisted by a 0-degree endoscope. Then, a hemitransfixion incision was made using a #15 blade on the left side. The submucoperichondrial plane was identified and this was elevated using a [place] elevator. This plane was carried posteriorly to the bony-cartilaginous junction.  A Nikhil elevator was used to incise the cartilage at the junction and a contralateral mucoperiosteal flap was elevated.         Additional elevation of the flaps over the vomer revealed a large spur that was jutting into the middle meatus. This portion of the bone was resected top and bottom using a Genoveva scissors and the spur was removed using a Esau forceps. After removal, there was no perforation of the mucoperichondrial flap.     At this point, 10,000 units of topical thrombin mixed with epinephrine concentrated 1:1000 parts was applied to pledgets and placed between the flaps for topical hemostasis. After these pledgets were removed, there was excellent hemostasis at the septal site.       Then, under endoscopic visualization, a transseptal quilting suture of 4-0 plain gut was used to reapproximate the nasal septal flaps. Then the hemitransfixion incision was closed using 4-0 chromic gut suture in figure-of-eight fashion times two.  Repeated nasal endoscopy at this point revealed marked improvement of the septal deviation and good visualization of the vertical suspension of both middle turbinates.    Attention was then turned to the turbinates.  Additional injections were performed around the inferior turbinates and the sphenopalatine ganglion region bilaterally.  Incisions were made in the anterior head of the inferior turbinate using a #6400 blade.  A Nikhil elevator was then tunnelled medially to the turbinate bone and used to segmentally  outfracture and morselize the bone.  Then, a 2 mm blade on the powered tissue shaver was tunneled submucosally and used to resect soft tissue of the turbinate while overlying mucosa was preserved. The posterior pole was polypoid and therefore reduced using the tissue shaver, with hemostasis by the suction cautery at a setting of 25 lopez. Finally, the turbinates were outfractured using a Laureano/Boies elevator.  An identical procedure was performed bilaterally.     Nasal endoscopy was then used to examine the nasopharynx.  The adenoid pad was at least 3+ and encroaching on the Eustachian tube lumen bilaterally.  In addition, discrete adenoid vegetations were seen on the posterior cushion of both Eustachian tube orifices.  These vegetations were ablated using electrocautery on a suction bovie at a setting of 20 lopez.    Attention was then turned to the remainder of the adenoids.  A Sal-Vladimir mouth gag was used to retract the patient's mouth open and fastened to the Palmetto stand. Palpation of the soft palate revealed no submucous cleft.  Red rubber catheters were placed into the nares bilaterally and passed around the palate to suspend it.  Then, using and angled mirror, the adenoids were visualized and ablated using a suction Bovie at a setting of 35 lopez.   Tonsil packs were then placed into the nasopharynx for temporary hemostasis.  Afterward, hemostasis was achieved in the adenoid bed with electrocautery.  After hemostasis was obtained, the nasopharynx was irrigated with normal saline and suctioned. An orogastric tube was placed into the patient's stomach and this was suctioned. The mouth gag was then removed and the drapes were taken down.     At the conclusion of the procedure there was good hemostasis.  Mupirocin ointment was applied to the vestibule bilaterally.  A nasal dressing was applied and the drapes were taken down.  The patient was then turned back toward the anesthesiologist and awakened from  anesthesia, extubated and transferred to the recovery room in stable condition.

## 2017-03-27 NOTE — IP AVS SNAPSHOT
[facility]  [address]  [facility] [address]  Phone: [phone]           Patient Discharge Instructions     Our goal is to set you up for success. This packet includes information on your condition, medications, and your home care. It will help you to care for yourself so you don't get sicker and need to go back to the hospital.     Please ask your nurse if you have any questions.        There are many details to remember when preparing to leave the hospital. Here is what you will need to do:    1. Take your medicine. If you are prescribed medications, review your Medication List in the following pages. You may have new medications to  at the pharmacy and others that you'll need to stop taking. Review the instructions for how and when to take your medications. Talk with your doctor or nurses if you are unsure of what to do.     2. Go to your follow-up appointments. Specific follow-up information is listed in the following pages. Your may be contacted by a transition nurse or clinical provider about future appointments. Be sure we have all of the phone numbers to reach you, if needed. Please contact your provider's office if you are unable to make an appointment.     3. Watch for warning signs. Your doctor or nurse will give you detailed warning signs to watch for and when to call for assistance. These instructions may also include educational information about your condition. If you experience any of warning signs to your health, call your doctor.               [facility]  Unless otherwise directed by your provider, please contact [facility], our nurse care line that is available for 24/7 assistance.     [phone] (toll-free)    Registered nurses in the [facility] provide clinical advisement, health education, appointment booking, and other advisory services.                    ** Verify the list of medication(s) below is accurate and up  to date. Carry this with you in case of emergency. If your medications have changed, please notify your healthcare provider.             Medication List      START taking these medications        Additional Info    Begin Date AM Noon PM Bedtime    cephALEXin 500 MG capsule   Commonly known as:  KEFLEX   Quantity:  40 capsule   Refills:  0   Dose:  500 mg    Instructions:  Take 1 capsule (500 mg total) by mouth every 8 (eight) hours.     First dose: tonight.                       oxycodone-acetaminophen 5-325 mg per tablet   Commonly known as:  PERCOCET   Quantity:  30 tablet   Refills:  0   Dose:  1 tablet    Last time this was given:  1 tablet on 3/27/2017  5:21 PM   Instructions:  Take 1 tablet by mouth every 6 (six) hours as needed for Pain.                 Next dose: 11:21 p.m.             CONTINUE taking these medications        Additional Info    Begin Date AM Noon PM Bedtime    albuterol 2.5 mg /3 mL (0.083 %) nebulizer solution   Commonly known as:  PROVENTIL   Quantity:  75 mL   Refills:  2   Dose:  2.5 mg    Instructions:  Take 3 mLs (2.5 mg total) by nebulization every 6 (six) hours as needed for Wheezing. Rescue                            ALBUTEROL INHL   Refills:  0    Instructions:  Inhale into the lungs.                            fluticasone 50 mcg/actuation nasal spray   Commonly known as:  FLONASE   Refills:  0   Dose:  1 spray    Instructions:  1 spray by Each Nare route once daily.                            fluticasone-salmeterol 250-50 mcg/dose 250-50 mcg/dose diskus inhaler   Commonly known as:  ADVAIR   Refills:  0   Dose:  1 puff    Instructions:  Inhale 1 puff into the lungs 2 (two) times daily.                            montelukast 10 mg tablet   Commonly known as:  SINGULAIR   Quantity:  30 tablet   Refills:  5    Instructions:  TAKE 1 TABLET (10 MG TOTAL) BY MOUTH EVERY EVENING.                              STOP taking these medications     acetaminophen 500 MG tablet   Commonly known  as:  TYLENOL            Where to Get Your Medications      You can get these medications from any pharmacy     Bring a paper prescription for each of these medications     cephALEXin 500 MG capsule    oxycodone-acetaminophen 5-325 mg per tablet                  Please bring to all follow up appointments:    1. A copy of your discharge instructions.  2. All medicines you are currently taking in their original bottles.  3. Identification and insurance card.    Please arrive 15 minutes ahead of scheduled appointment time.    Please call 24 hours in advance if you must reschedule your appointment and/or time.        Your Scheduled Appointments     Apr 04, 2017  2:30 PM CDT   Post OP with MD Edgar Russell - Otorhinolaryngology ([facility] )    [address]  [place] [address]   [phone]            Jun 07, 2017  8:40 AM CDT   Established Patient Visit with Viry Rutherford NP   [facility] ([facility])    [address] [place] [id number] [phone]              Follow-up Information     Follow up with Robert Tolbert MD On 4/4/2017.    Specialty:  Otolaryngology    Why:  For wound re-check    Contact information:    [address] [facility] [address]  [phone]          Discharge Instructions     Future Orders    Call MD for:  difficulty breathing or increased cough     Call MD for:  increased confusion or weakness     Call MD for:  persistent dizziness, light-headedness, or visual disturbances     Call MD for:  persistent nausea and vomiting or diarrhea     Call MD for:  redness, tenderness, or signs of infection (pain, swelling, redness, odor or green/yellow discharge around incision site)     Call MD for:  severe persistent headache     Call MD for:  severe uncontrolled pain     Call MD for:  temperature >100.4     Call MD for:  worsening rash     Diet general     Questions:    Total calories:      Fat restriction, if any:       Protein restriction, if any:      Na restriction, if any:      Fluid restriction:      Additional restrictions:      Lifting restrictions     Other restrictions (specify):     Comments:    INSTRUCTIONS TO FOLLOW AFTER SINUS SURGERY  DR. McCOUL  [facility]      Your first office visit with Dr. Tolbert after surgery should have been already scheduled.  If you dont know when it is, call Dr. Diop nurse Ady at [phone].    ACTIVITY:    Sleep on your back with the head of the bead elevated, up on 2-3 pillows, or in a recliner for the first 3 to 5 days. This will help with swelling.     After surgery you may have a lot of nasal drainage. This is normal. Do not wipe, touch, or dab your nose. You may breathe through your nose if youre able but avoid inhaling forcibly. Let all drainage fall on your mustache dressing and change it as needed.    You may shower 24 hours after surgery.    If you use CPAP or BiPAP to sleep at night, you should wait at least 48 hours before resuming use.  Dr. Tolbert will advise you when it is safe to do this.    DRESSINGS:    Change the mustache dressing under your nose as needed. (If unsure what this dressing is or how to do this, ask your doctor or nurse before you leave the clinic/hospital.) You may have pinkish-red drainage for 2-3 days.    In certain cases you may have gauze packing inside your nostrils.  If so, these will turn from white to red from the drainage. This is normal so do not be alarmed. Do not touch or pull at the packing. The packing will be removed by your doctor at your first post-op visit.     You may also have a dissolvable stent or dissolvable sponge placed into the sinuses during surgery.  These usually do not need to be removed unless you are told otherwise by Dr. Tolbert.  You may notice small fragments of these items come out of your nose in the weeks following surgery.    MEDICATIONS:  After surgery, you are generally sent home with prescription for an  antibiotic, a pain medication, and an anti-nausea medication.     Start your antibiotics when you get home from surgery and take them until they are all gone.  It is best to take them with food to prevent an upset stomach.    Most people need prescription pain medication for the first few days after surgery.  If you find that this is not necessary, you may use over-the-counter Tylenol (Acetaminophen) instead.    Avoid Aspirin, Motrin (Ibuprofen), Advil, Aleve and Vitamin E for 1 week before surgery and 1 week after surgery. There are many other medications to avoid as well due to the fact they act as blood thinners.      Some people have problems with bowel movements after surgery. If you have NOT had a bowel movement 3-5 days after surgery, go to your local pharmacy and purchase an over the counter stool softener such as COLACE. You can also ask the pharmacist for his or her recommendation. If you still do not have a bowel movement after starting the softener, please call Dr. Diop office.    You will need these over-the-counter medications after surgery:    Saline Sinus Rinse (Alberto Med brand):  You will use this to rinse out your nose and sinuses after surgery.  Begin doing this the day after surgery, unless instructed otherwise by Dr. Tolbert.  You should do this 2 times a day, following the instructions on the box.    Afrin (regular strength): Only use if you have nasal congestion or bleeding. Use 2 times per day for 3 days, stop for 1 day, continue 2 times per day for 3 days, then stop completely.  NOTE:  You may not need to do this at all.      RESTRICTED ACTIVITIES AFTER SURGERY:    Do NOT blow your nose for 2 weeks. If you have to sneeze or cough, do so with your mouth open.   AVOID all heavy lifting, straining or bending for 2 weeks.   AVOID any sexual activity for 2 weeks after surgery.  AVOID semi-contact sports or vigorous exercising for 3-4 weeks. Dr. Tolbert will let you know when you are cleared to  "resume exercise.  No Swimming for 3-4 weeks.  No Flying for 2 weeks.    Do NOT operate a motor vehicle or any type of heavy machinery within 24 hours of taking pain medication.  DO NOT smoke or be around smokers.  AVOID irritating substances such as dust, chalk, harsh chemicals, and allergic triggers.    DIET:    Avoid hot and spicy foods, or salty soups for 1 week after surgery.  Begin with bland foods the day after surgery and advance to your regular diet as tolerated.  It is not necessary to take only soft food unless you are recovering from tonsil surgery.  Drink plenty of fluids (water is best).   Avoid alcoholic and caffeinated beverages for 1 week after surgery.    Weight bearing restrictions (specify)         Admission Information     Date & Time Provider Department CSN    3/27/2017 10:28 AM Robert Tolbert MD [facility] [id number]      Care Providers     Provider Role Specialty Primary office phone    Robert Tolbert MD Attending Provider Otolaryngology [phone]    Robert Tolbert MD Surgeon  Otolaryngology [phone]      Your Vitals Were     BP Pulse Temp Resp    134/85 (BP Location: Right arm, Patient Position: Lying, BP Method: cNIBP) 95 97.7 °F (36.5 °C) (Axillary) 16    Height Weight SpO2 BMI    6' 1" (1.854 m) 122.7 kg (270 lb 8.1 oz) 99% 35.69 kg/m2      Recent Lab Values     No lab values to display.      Allergies as of 3/27/2017     No Known Allergies      Advance Directives     An advance directive is a document which, in the event you are no longer able to make decisions for yourself, tells your healthcare team what kind of treatment you do or do not want to receive, or who you would like to make those decisions for you.  If you do not currently have an advance directive, [facility] encourages you to create one.  For more information call:  [phone]), [phone] ([phone]),  or log on to [email or web address].        Language Assistance " Services     ATTENTION: Language assistance services are available, free of charge. Please call [phone].      ATENCIÓN: Si habla jayjayañol, tiene a serna disposición servicios gratuitos de asistencia lingüística. Llame al [phone].     CHÚ Ý: N?u b?n nói Ti?ng Vi?t, có các d?ch v? h? tr? ngôn ng? mi?n phí dành cho b?n. G?i s? [phone].         [facility] complies with applicable Federal civil rights laws and does not discriminate on the basis of race, color, national origin, age, disability, or sex.

## 2017-03-27 NOTE — ANESTHESIA POSTPROCEDURE EVALUATION
"Anesthesia Post Evaluation    Patient: Alan David Reyes    Procedure(s) Performed: Procedure(s) (LRB):  SEPTOPLASTY (Bilateral)  REDUCTION-TURBINATE (Bilateral)  SINUS SURGERY FUNCTIONAL ENDOSCOPIC (Bilateral)  ADENOIDECTOMY (Bilateral)    Final Anesthesia Type: general  Patient location during evaluation: PACU  Patient participation: Yes- Able to Participate  Level of consciousness: awake and alert  Post-procedure vital signs: reviewed and stable  Pain management: adequate  Airway patency: patent  PONV status at discharge: No PONV  Anesthetic complications: no      Cardiovascular status: blood pressure returned to baseline  Respiratory status: unassisted  Hydration status: euvolemic  Follow-up not needed.        Visit Vitals    BP (!) 137/93    Pulse 81    Temp 36.5 °C (97.7 °F) (Axillary)    Resp 18    Ht 6' 1" (1.854 m)    Wt 122.7 kg (270 lb 8.1 oz)    SpO2 96%    BMI 35.69 kg/m2       Pain/Tamir Score: Pain Assessment Performed: Yes (3/27/2017  4:44 PM)  Presence of Pain: non-verbal indicators absent (3/27/2017  4:44 PM)  Pain Rating Prior to Med Admin: 0 (3/27/2017  5:21 PM)      "

## 2017-03-28 NOTE — PLAN OF CARE
Patient arrived from PACU with KARY Wong RN.  Patient stable, but complaining of 7/10 pain.  Report received at this time.  Assumed care of patient at this time.

## 2017-04-04 ENCOUNTER — OFFICE VISIT (OUTPATIENT)
Dept: OTOLARYNGOLOGY | Facility: CLINIC | Age: 27
End: 2017-04-04
Payer: COMMERCIAL

## 2017-04-04 VITALS
WEIGHT: 279.31 LBS | SYSTOLIC BLOOD PRESSURE: 144 MMHG | BODY MASS INDEX: 37.02 KG/M2 | DIASTOLIC BLOOD PRESSURE: 98 MMHG | HEIGHT: 73 IN | HEART RATE: 93 BPM

## 2017-04-04 DIAGNOSIS — J34.2 DEVIATED NASAL SEPTUM: ICD-10-CM

## 2017-04-04 DIAGNOSIS — J34.3 NASAL TURBINATE HYPERTROPHY: ICD-10-CM

## 2017-04-04 DIAGNOSIS — J35.2 ADENOID HYPERTROPHY: Primary | ICD-10-CM

## 2017-04-04 PROCEDURE — 31231 NASAL ENDOSCOPY DX: CPT | Mod: 79,S$GLB,, | Performed by: OTOLARYNGOLOGY

## 2017-04-04 PROCEDURE — 99024 POSTOP FOLLOW-UP VISIT: CPT | Mod: S$GLB,,, | Performed by: OTOLARYNGOLOGY

## 2017-04-04 PROCEDURE — 99999 PR PBB SHADOW E&M-EST. PATIENT-LVL III: CPT | Mod: PBBFAC,,, | Performed by: OTOLARYNGOLOGY

## 2017-04-04 RX ORDER — OXYCODONE AND ACETAMINOPHEN 5; 325 MG/1; MG/1
1 TABLET ORAL EVERY 6 HOURS PRN
Qty: 30 TABLET | Refills: 0 | Status: SHIPPED | OUTPATIENT
Start: 2017-04-04 | End: 2017-04-14

## 2017-04-04 NOTE — PROGRESS NOTES
"  Subjective:      Alan David Reyes is a 26 y.o. male who comes for follow-up 1 week status-post septum and turbinate surgery.  Doing well, breathing well, mild crusting and blood scabs with rinse.  Still requiring analgesic for pain control.    QOL assessment deferred.      Objective:     BP (!) 144/98  Pulse 93  Ht 6' 1" (1.854 m)  Wt 126.7 kg (279 lb 5.2 oz)  BMI 36.85 kg/m2     General:   not in distress   Nasal:  edematous mucosa   incision intact   no septal hematoma   no bleeding   Oral Cavity:   clear   Oropharynx:   no bleeding   Neck:   nontender       Procedure     Nasal endoscopy performed.  See procedure note.        Data Reviewed    None.      Assessment:     Doing well following septoplasty and turbinate reduction with adenoidectomy.    1. Adenoid hypertrophy    2. Deviated nasal septum    3. Nasal turbinate hypertrophy         Plan:     Continue saline rinse.  Return in about 1 month (around 5/4/2017).      "

## 2017-04-04 NOTE — PROCEDURES
Nasal/sinus endoscopy  Date/Time: 4/4/2017 3:54 PM  Performed by: KARLA ABREU  Authorized by: KARLA ABREU     Consent Done?:  Yes (Verbal)  Anesthesia:     Local anesthetic:  Lidocaine 4% and Edy-Synephrine 1/2%    Patient tolerance:  Patient tolerated the procedure well with no immediate complications  Nose:     Procedure Performed:  Nasal Endoscopy  External:      No external nasal deformity  Intranasal:      Mucosa no polyps     Mucosa ulcers not present     No mucosa lesions present     Turbinates not enlarged     No septum gross deformity  Nasopharynx:      No mucosa lesions     Adenoids not present     Posterior choanae patent     Eustachian tube patent     Scabs in posterior turbinate and adenoid region, removed.

## 2017-04-06 ENCOUNTER — TELEPHONE (OUTPATIENT)
Dept: OTOLARYNGOLOGY | Facility: CLINIC | Age: 27
End: 2017-04-06

## 2017-04-06 NOTE — TELEPHONE ENCOUNTER
Patient complains of diarrhea and chills. Says may have fever however don't have a thermometer to check. Instructed as per Dr. Tolbert to stop antibiotics completely and if persist for more than 48 hours to see PCP. Verbalized understanding. Call PRN.

## 2017-04-07 ENCOUNTER — LAB VISIT (OUTPATIENT)
Dept: LAB | Facility: HOSPITAL | Age: 27
End: 2017-04-07
Attending: FAMILY MEDICINE
Payer: COMMERCIAL

## 2017-04-07 ENCOUNTER — OFFICE VISIT (OUTPATIENT)
Dept: FAMILY MEDICINE | Facility: CLINIC | Age: 27
End: 2017-04-07
Attending: FAMILY MEDICINE
Payer: COMMERCIAL

## 2017-04-07 VITALS
HEART RATE: 81 BPM | TEMPERATURE: 98 F | BODY MASS INDEX: 36.09 KG/M2 | SYSTOLIC BLOOD PRESSURE: 136 MMHG | OXYGEN SATURATION: 99 % | HEIGHT: 73 IN | WEIGHT: 272.31 LBS | DIASTOLIC BLOOD PRESSURE: 90 MMHG

## 2017-04-07 DIAGNOSIS — R19.7 DIARRHEA IN ADULT PATIENT: ICD-10-CM

## 2017-04-07 DIAGNOSIS — Z98.890 STATUS POST NASAL SEPTOPLASTY: ICD-10-CM

## 2017-04-07 DIAGNOSIS — R10.12 LUQ PAIN: ICD-10-CM

## 2017-04-07 DIAGNOSIS — R10.12 LUQ PAIN: Primary | ICD-10-CM

## 2017-04-07 LAB
ALBUMIN SERPL BCP-MCNC: 3.9 G/DL
ALP SERPL-CCNC: 51 U/L
ALT SERPL W/O P-5'-P-CCNC: 67 U/L
AMORPH CRY UR QL COMP ASSIST: ABNORMAL
ANION GAP SERPL CALC-SCNC: 10 MMOL/L
AST SERPL-CCNC: 32 U/L
BACTERIA #/AREA URNS AUTO: ABNORMAL /HPF
BASOPHILS # BLD AUTO: 0.02 K/UL
BASOPHILS NFR BLD: 0.6 %
BILIRUB SERPL-MCNC: 1.7 MG/DL
BILIRUB UR QL STRIP: NEGATIVE
BUN SERPL-MCNC: 12 MG/DL
CALCIUM SERPL-MCNC: 8.9 MG/DL
CHLORIDE SERPL-SCNC: 105 MMOL/L
CLARITY UR REFRACT.AUTO: ABNORMAL
CO2 SERPL-SCNC: 23 MMOL/L
COLOR UR AUTO: YELLOW
CREAT SERPL-MCNC: 1 MG/DL
DIFFERENTIAL METHOD: ABNORMAL
EOSINOPHIL # BLD AUTO: 0.1 K/UL
EOSINOPHIL NFR BLD: 2.3 %
ERYTHROCYTE [DISTWIDTH] IN BLOOD BY AUTOMATED COUNT: 12.9 %
EST. GFR  (AFRICAN AMERICAN): >60 ML/MIN/1.73 M^2
EST. GFR  (NON AFRICAN AMERICAN): >60 ML/MIN/1.73 M^2
GLUCOSE SERPL-MCNC: 74 MG/DL
GLUCOSE UR QL STRIP: NEGATIVE
HCT VFR BLD AUTO: 46.9 %
HGB BLD-MCNC: 16.1 G/DL
HGB UR QL STRIP: ABNORMAL
KETONES UR QL STRIP: NEGATIVE
LEUKOCYTE ESTERASE UR QL STRIP: NEGATIVE
LYMPHOCYTES # BLD AUTO: 1.5 K/UL
LYMPHOCYTES NFR BLD: 44.2 %
MCH RBC QN AUTO: 28.9 PG
MCHC RBC AUTO-ENTMCNC: 34.3 %
MCV RBC AUTO: 84 FL
MICROSCOPIC COMMENT: ABNORMAL
MONOCYTES # BLD AUTO: 0.5 K/UL
MONOCYTES NFR BLD: 15.6 %
NEUTROPHILS # BLD AUTO: 1.3 K/UL
NEUTROPHILS NFR BLD: 36.4 %
NITRITE UR QL STRIP: NEGATIVE
PH UR STRIP: 5 [PH] (ref 5–8)
PLATELET # BLD AUTO: 214 K/UL
PMV BLD AUTO: 9.3 FL
POTASSIUM SERPL-SCNC: 4.2 MMOL/L
PROT SERPL-MCNC: 7.3 G/DL
PROT UR QL STRIP: NEGATIVE
RBC # BLD AUTO: 5.58 M/UL
RBC #/AREA URNS AUTO: 0 /HPF (ref 0–4)
SODIUM SERPL-SCNC: 138 MMOL/L
SP GR UR STRIP: 1.03 (ref 1–1.03)
URN SPEC COLLECT METH UR: ABNORMAL
UROBILINOGEN UR STRIP-ACNC: NEGATIVE EU/DL
WBC # BLD AUTO: 3.46 K/UL

## 2017-04-07 PROCEDURE — 1160F RVW MEDS BY RX/DR IN RCRD: CPT | Mod: S$GLB,,, | Performed by: FAMILY MEDICINE

## 2017-04-07 PROCEDURE — 99214 OFFICE O/P EST MOD 30 MIN: CPT | Mod: S$GLB,,, | Performed by: FAMILY MEDICINE

## 2017-04-07 PROCEDURE — 80053 COMPREHEN METABOLIC PANEL: CPT

## 2017-04-07 PROCEDURE — 85025 COMPLETE CBC W/AUTO DIFF WBC: CPT

## 2017-04-07 PROCEDURE — 99999 PR PBB SHADOW E&M-EST. PATIENT-LVL III: CPT | Mod: PBBFAC,,, | Performed by: FAMILY MEDICINE

## 2017-04-07 PROCEDURE — 36415 COLL VENOUS BLD VENIPUNCTURE: CPT | Mod: PO

## 2017-04-07 PROCEDURE — 81001 URINALYSIS AUTO W/SCOPE: CPT

## 2017-04-07 RX ORDER — ONDANSETRON 8 MG/1
8 TABLET, ORALLY DISINTEGRATING ORAL EVERY 12 HOURS PRN
Qty: 6 TABLET | Refills: 0 | Status: SHIPPED | OUTPATIENT
Start: 2017-04-07 | End: 2017-05-09

## 2017-04-07 NOTE — MR AVS SNAPSHOT
[facility]  [address], [address]  [facility] [address]  Phone: [phone]                  Alan David Reyes   2017 11:20 AM   Office Visit    Description:  Male : 1990   Provider:  Dameon Putnam Jr., MD   Department:  [facility]           Reason for Visit     Abdominal Pain           Diagnoses this Visit        Comments    LUQ pain    -  Primary     Diarrhea in adult patient         Status post nasal septoplasty                To Do List           Future Appointments        Provider Department Dept Phone    2017 2:00 PM MD Edgar Pan III luis - Allergy/ Immunology [phone]    2017 1:45 PM MD Edgar Russell luis - Otorhinolaryngology [phone]    2017 8:40 AM Viry Rutherford NP [facility] [phone]      Goals (5 Years of Data)     None       These Medications        Disp Refills Start End    ondansetron (ZOFRAN-ODT) 8 MG TbDL 6 tablet 0 2017     Take 1 tablet (8 mg total) by mouth every 12 (twelve) hours as needed. - Oral    Pharmacy: [facility]/pharmacy #[id number] - [place], [place] - [address] Ph #: [phone]         [facility]     [facility] -  Assistance  Unless otherwise directed by your provider, please contact [facility], our nurse care line that is available for  assistance.     Registered nurses in the [facility] provide: appointment scheduling, clinical advisement, health education, and other advisory services.  Call: [phone] (toll free)               Medications           Message regarding Medications     Verify the changes and/or additions to your medication regime listed below are the same as discussed with your clinician today.  If any of these changes or additions are incorrect, please notify your healthcare provider.        START taking these NEW medications        Refills    ondansetron (ZOFRAN-ODT) 8  "MG TbDL 0    Sig: Take 1 tablet (8 mg total) by mouth every 12 (twelve) hours as needed.    Class: Normal    Route: Oral      STOP taking these medications     cephALEXin (KEFLEX) 500 MG capsule Take 1 capsule (500 mg total) by mouth every 8 (eight) hours.           Verify that the below list of medications is an accurate representation of the medications you are currently taking.  If none reported, the list may be blank. If incorrect, please contact your healthcare provider. Carry this list with you in case of emergency.           Current Medications     albuterol (PROVENTIL) 2.5 mg /3 mL (0.083 %) nebulizer solution Take 3 mLs (2.5 mg total) by nebulization every 6 (six) hours as needed for Wheezing. Rescue    ALBUTEROL INHL Inhale into the lungs.    fluticasone (FLONASE) 50 mcg/actuation nasal spray 1 spray by Each Nare route once daily.    fluticasone-salmeterol 250-50 mcg/dose (ADVAIR) 250-50 mcg/dose diskus inhaler Inhale 1 puff into the lungs 2 (two) times daily.    montelukast (SINGULAIR) 10 mg tablet TAKE 1 TABLET (10 MG TOTAL) BY MOUTH EVERY EVENING.    oxycodone-acetaminophen (PERCOCET) 5-325 mg per tablet Take 1 tablet by mouth every 6 (six) hours as needed for Pain.    ondansetron (ZOFRAN-ODT) 8 MG TbDL Take 1 tablet (8 mg total) by mouth every 12 (twelve) hours as needed.           Clinical Reference Information           Your Vitals Were     BP Pulse Temp Height    136/90 (BP Location: Left arm, Patient Position: Sitting, BP Method: Manual) 81 97.8 °F (36.6 °C) (Oral) 6' 0.5" (1.842 m)    Weight SpO2 BMI    123.5 kg (272 lb 4.8 oz) 99% 36.42 kg/m2      Blood Pressure          Most Recent Value    BP  (!)  136/90      Allergies as of 4/7/2017     No Known Allergies      Immunizations Administered on Date of Encounter - 4/7/2017     None      Orders Placed During Today's Visit      Normal Orders This Visit    Urinalysis     Future Labs/Procedures Expected by Expires    CBC auto differential  4/7/2017 " 4/8/2018    CLOSTRIDIUM DIFFICILE  4/7/2017 6/6/2018    Comprehensive metabolic panel  4/7/2017 4/8/2018    CULTURE, STOOL  4/7/2017 4/7/2018    Occult blood x 1, stool  4/7/2017 4/7/2018    Stool Exam-Ova,Cysts,Parasites  4/7/2017 4/7/2018      Language Assistance Services     ATTENTION: Language assistance services are available, free of charge. Please call [phone].      ATENCIÓN: Si habla español, tiene a serna disposición servicios gratuitos de asistencia lingüística. Llame al [phone].     CHÚ Ý: N?u b?n nói Ti?ng Vi?t, có các d?ch v? h? tr? ngôn ng? mi?n phí dành cho b?n. G?i s? [phone].         [facility] complies with applicable Federal civil rights laws and does not discriminate on the basis of race, color, national origin, age, disability, or sex.

## 2017-04-07 NOTE — PROGRESS NOTES
Subjective:       Patient ID: Alan David Reyes is a 26 y.o. male.    Chief Complaint: Abdominal Pain    Abdominal Pain   This is a new problem. The current episode started in the past 7 days. The onset quality is sudden. The problem occurs constantly. The problem has been gradually improving. The pain is located in the LUQ. The pain is at a severity of 8/10. The pain is severe. The quality of the pain is cramping and sharp. The abdominal pain does not radiate. Associated symptoms include diarrhea (~9 BMs/12 hrs: watery,), a fever (to 100.9) and nausea. Pertinent negatives include no vomiting. The pain is aggravated by eating. The pain is relieved by nothing. Treatments tried: Imodium. The treatment provided mild relief. Prior workup: plain films: normal. There is no history of abdominal surgery, Crohn's disease, gallstones or ulcerative colitis.      He was seen at an urgent care center last evening.    He recently had septoplasty surgery on 3/27/2017; he was prescribed cephalexin at discharge and d/c'd that antibiotic 2 days ago.  His symptoms have improved slowly; last diarrhea stool this AM (only 1 today: still watery).  No blood; brownish/orange watery stool.      Review of Systems   Constitutional: Positive for fever (to 100.9).   Respiratory: Negative.    Cardiovascular: Negative.    Gastrointestinal: Positive for abdominal distention, abdominal pain, diarrhea (~9 BMs/12 hrs: watery,) and nausea. Negative for anal bleeding, blood in stool, rectal pain and vomiting.   Genitourinary: Negative.        Objective:      Physical Exam   Constitutional: He is oriented to person, place, and time. He appears well-developed and well-nourished.   HENT:   Head: Normocephalic.   Eyes: Conjunctivae are normal. No scleral icterus.   Cardiovascular: Normal rate, regular rhythm and normal heart sounds.  Exam reveals no gallop.    No murmur heard.  Pulmonary/Chest: Effort normal and breath sounds normal. He has no wheezes. He  "has no rales.   Abdominal: Soft. He exhibits no distension and no mass. Bowel sounds are increased. There is no tenderness.   Musculoskeletal: He exhibits no edema.   Neurological: He is alert and oriented to person, place, and time.   Skin: Skin is warm and dry.   Psychiatric: He has a normal mood and affect.   Vitals reviewed.      Assessment:       1. LUQ pain    2. Diarrhea in adult patient    3. Status post nasal septoplasty          Plan:       Antibiotic associated diarrhea?    Orders Placed This Encounter    CULTURE, STOOL    CLOSTRIDIUM DIFFICILE    Stool Exam-Ova,Cysts,Parasites    Occult blood x 1, stool    CBC auto differential    Comprehensive metabolic panel    Urinalysis     Continue fluid replacement.  Imodium AD prn.  Zofran prn.  We will call the patient with results & make further recommendations at that time.      "This note will not be shared with the patient."  "

## 2017-04-08 ENCOUNTER — PATIENT MESSAGE (OUTPATIENT)
Dept: FAMILY MEDICINE | Facility: CLINIC | Age: 27
End: 2017-04-08

## 2017-04-10 ENCOUNTER — PATIENT MESSAGE (OUTPATIENT)
Dept: FAMILY MEDICINE | Facility: CLINIC | Age: 27
End: 2017-04-10

## 2017-04-25 ENCOUNTER — OFFICE VISIT (OUTPATIENT)
Dept: ALLERGY | Facility: CLINIC | Age: 27
End: 2017-04-25
Payer: COMMERCIAL

## 2017-04-25 ENCOUNTER — LAB VISIT (OUTPATIENT)
Dept: LAB | Facility: HOSPITAL | Age: 27
End: 2017-04-25
Attending: ALLERGY & IMMUNOLOGY
Payer: COMMERCIAL

## 2017-04-25 ENCOUNTER — HOSPITAL ENCOUNTER (OUTPATIENT)
Dept: PULMONOLOGY | Facility: CLINIC | Age: 27
Discharge: HOME | End: 2017-04-25
Payer: COMMERCIAL

## 2017-04-25 VITALS
TEMPERATURE: 98 F | HEIGHT: 73 IN | BODY MASS INDEX: 37.34 KG/M2 | DIASTOLIC BLOOD PRESSURE: 98 MMHG | HEART RATE: 80 BPM | WEIGHT: 281.75 LBS | SYSTOLIC BLOOD PRESSURE: 158 MMHG | RESPIRATION RATE: 20 BRPM

## 2017-04-25 DIAGNOSIS — J45.20 ASTHMA IN ADULT, MILD INTERMITTENT, UNCOMPLICATED: ICD-10-CM

## 2017-04-25 DIAGNOSIS — K21.9 GASTROESOPHAGEAL REFLUX DISEASE, ESOPHAGITIS PRESENCE NOT SPECIFIED: ICD-10-CM

## 2017-04-25 DIAGNOSIS — J31.0 CHRONIC RHINITIS: ICD-10-CM

## 2017-04-25 DIAGNOSIS — H10.403 CHRONIC CONJUNCTIVITIS OF BOTH EYES, UNSPECIFIED CHRONIC CONJUNCTIVITIS TYPE: ICD-10-CM

## 2017-04-25 DIAGNOSIS — J45.20 ASTHMA IN ADULT, MILD INTERMITTENT, UNCOMPLICATED: Primary | ICD-10-CM

## 2017-04-25 LAB
PRE FEV1 FVC: 81
PRE FEV1: 4.69
PRE FVC: 5.79
PREDICTED FEV1 FVC: 84
PREDICTED FEV1: 4.89
PREDICTED FVC: 5.87

## 2017-04-25 PROCEDURE — 82785 ASSAY OF IGE: CPT

## 2017-04-25 PROCEDURE — 99244 OFF/OP CNSLTJ NEW/EST MOD 40: CPT | Mod: S$GLB,,, | Performed by: ALLERGY & IMMUNOLOGY

## 2017-04-25 PROCEDURE — 86003 ALLG SPEC IGE CRUDE XTRC EA: CPT | Mod: 59

## 2017-04-25 PROCEDURE — 99999 PR PBB SHADOW E&M-EST. PATIENT-LVL III: CPT | Mod: PBBFAC,,, | Performed by: ALLERGY & IMMUNOLOGY

## 2017-04-25 PROCEDURE — 36415 COLL VENOUS BLD VENIPUNCTURE: CPT

## 2017-04-25 PROCEDURE — 94010 BREATHING CAPACITY TEST: CPT | Mod: S$GLB,,, | Performed by: INTERNAL MEDICINE

## 2017-04-25 PROCEDURE — 86003 ALLG SPEC IGE CRUDE XTRC EA: CPT

## 2017-04-25 RX ORDER — MINERAL OIL
180 ENEMA (ML) RECTAL DAILY
COMMUNITY
End: 2017-12-18 | Stop reason: ALTCHOICE

## 2017-04-25 NOTE — PROGRESS NOTES
"Fredo Reeves" Reyes is referred by Dr. Dameon Putnam for a consult regarding chronic rhinitis and asthma. He is here alone.    He is originally from [place], [place]. He went to [employer] and is now working in [place] for [employer]. He is an .     He has had chronic rhinitis and asthma since childhood. He has frontal headaches, pruritus of the nose, eyes, ears, and throat eye redness, ear fullness and pressure, bilateral nasal congestion that alternates, postnasal drip, and intermittent wheezing.    His wheezing is worse with exercise when he first starts.    His symptoms are controlled with Advair twice a day. He only occasionally needs albuterol.    He also takes Allegra daily. His last one was this morning.    He had a septoplasty, turbinate reduction, FESS, and adenoidectomy by Dr. Tolbert on March 27, 2017.    He was advised not to take Flonase for a month. He was taking this sporadically beforehand.    He does have a peak flow meter at home. He thinks his peak flow is usually 500.    For ROS, FH, SH please see Allergy and Asthma Questionnaire dated today.    Some relevant pertinent positives:    Review of Systems:  He does have gastroesophageal reflux disease with almost daily symptoms. He has been advised in the past to take OTC Prilosec. He is currently not doing that.    Family History: His brother has asthma and is allergic to milk.    Home environment: He has lived in the same house for the past 9 months. There was water damage with a roof leak. It has been repaired. There is some evidence of mold. He does not have carpeting in his bedroom. There is a dog that lives inside the house. He does not have problems around the dog.    Social History: He is an . He is a nonsmoker.  He has lived in [place]. He may be moving to [place].    Physical Examination:  General: Well-developed, well-nourished, no acute distress. 281 pounds.  Head: No sinus tenderness.  Eyes: " Conjunctivae:  No bulbar or palpebral conjunctival injection.  Ears: EAC's clear.  TM's clear.  No pre-auricular nodes.  Nose: Nasal Mucosa:  Pink.  Septum: No apparent deviation.  Turbinates:  No significant edema.  Polyps/Mass:  None visible.  Teeth/Gums:  No bleeding noted.  Oropharynx: No exudates.  Neck: Supple without thyromegaly. No cervical lymphadenopathy.    Respiratory/Chest: Effort: Good.  Auscultation:  Clear bilaterally.  Cardiovascular:  No murmur, rubs, or gallop heard.   GI:  Non-tender.  No masses.  No organomegaly.  Extremities:  No swelling.  No cyanosis, clubbing, or edema.  Skin: Good turgor.  No urticaria or angioedema.  Neuro/Psych: Oriented x 3.    Assessment:  1. Chronic rhinitis, consider allergic.  2. Chronic conjunctivitis, consider allergic.  3. Chronic asthma consider allergic.  4. GERD.  5. S/P septoplasty, turbinate reduction, FESS, and adenoidectomy Dr. Tolbert on March 27, 2017.    Recommendations:  1. Laboratory as ordered.  2. Spirometry.  3. Continue Allegra daily.  4. Restart Flonase and follow symptoms.  5. Continue Advair.  6. Albuterol as needed.  7. Relationship between rhinitis, asthma, and reflux was discussed.  8. Return to clinic in 2 weeks.  9. Bring peak flow readings in.

## 2017-04-26 LAB — IGE SERPL-ACNC: <35 IU/ML

## 2017-04-27 LAB
A ALTERNATA IGE QN: <0.35 KU/L
A FUMIGATUS IGE QN: <0.35 KU/L
BERMUDA GRASS IGE QN: <0.35 KU/L
CAT DANDER IGE QN: <0.35 KU/L
CEDAR IGE QN: <0.35 KU/L
D FARINAE IGE QN: 0.89 KU/L
D PTERONYSS IGE QN: 1.18 KU/L
DEPRECATED A ALTERNATA IGE RAST QL: NORMAL
DEPRECATED A FUMIGATUS IGE RAST QL: NORMAL
DEPRECATED BERMUDA GRASS IGE RAST QL: NORMAL
DEPRECATED CAT DANDER IGE RAST QL: NORMAL
DEPRECATED CEDAR IGE RAST QL: NORMAL
DEPRECATED D FARINAE IGE RAST QL: ABNORMAL
DEPRECATED D PTERONYSS IGE RAST QL: ABNORMAL
DEPRECATED DOG DANDER IGE RAST QL: NORMAL
DEPRECATED ENGL PLANTAIN IGE RAST QL: NORMAL
DEPRECATED MARSH ELDER IGE RAST QL: NORMAL
DEPRECATED ROACH IGE RAST QL: NORMAL
DEPRECATED TIMOTHY IGE RAST QL: NORMAL
DEPRECATED WHITE OAK IGE RAST QL: NORMAL
DOG DANDER IGE QN: <0.35 KU/L
ENGL PLANTAIN IGE QN: <0.35 KU/L
MARSH ELDER IGE QN: <0.35 KU/L
RAGWEED, WESTERN IGE: <0.35 KU/L
RAGWEED, WESTERN, CLASS: NORMAL
ROACH IGE QN: <0.35 KU/L
TIMOTHY IGE QN: <0.35 KU/L
WHITE OAK IGE QN: <0.35 KU/L

## 2017-05-09 ENCOUNTER — OFFICE VISIT (OUTPATIENT)
Dept: ALLERGY | Facility: CLINIC | Age: 27
End: 2017-05-09
Payer: COMMERCIAL

## 2017-05-09 ENCOUNTER — OFFICE VISIT (OUTPATIENT)
Dept: OTOLARYNGOLOGY | Facility: CLINIC | Age: 27
End: 2017-05-09
Payer: COMMERCIAL

## 2017-05-09 VITALS
BODY MASS INDEX: 37.05 KG/M2 | HEIGHT: 73 IN | SYSTOLIC BLOOD PRESSURE: 158 MMHG | HEART RATE: 105 BPM | WEIGHT: 279.56 LBS | DIASTOLIC BLOOD PRESSURE: 98 MMHG

## 2017-05-09 VITALS
SYSTOLIC BLOOD PRESSURE: 132 MMHG | DIASTOLIC BLOOD PRESSURE: 88 MMHG | HEIGHT: 73 IN | HEART RATE: 79 BPM | BODY MASS INDEX: 36.81 KG/M2 | WEIGHT: 277.75 LBS

## 2017-05-09 DIAGNOSIS — J30.9 ALLERGIC RHINITIS, UNSPECIFIED ALLERGIC RHINITIS TRIGGER, UNSPECIFIED RHINITIS SEASONALITY: ICD-10-CM

## 2017-05-09 DIAGNOSIS — J30.89 ALLERGIC RHINITIS DUE TO DUST MITE: ICD-10-CM

## 2017-05-09 DIAGNOSIS — J45.30 ASTHMA IN ADULT, MILD PERSISTENT, UNCOMPLICATED: Primary | ICD-10-CM

## 2017-05-09 DIAGNOSIS — H69.93 EUSTACHIAN TUBE DYSFUNCTION, BILATERAL: Primary | ICD-10-CM

## 2017-05-09 PROCEDURE — 99213 OFFICE O/P EST LOW 20 MIN: CPT | Mod: 24,S$GLB,, | Performed by: OTOLARYNGOLOGY

## 2017-05-09 PROCEDURE — 99999 PR PBB SHADOW E&M-EST. PATIENT-LVL III: CPT | Mod: PBBFAC,,, | Performed by: OTOLARYNGOLOGY

## 2017-05-09 PROCEDURE — 1160F RVW MEDS BY RX/DR IN RCRD: CPT | Mod: S$GLB,,, | Performed by: OTOLARYNGOLOGY

## 2017-05-09 PROCEDURE — 99214 OFFICE O/P EST MOD 30 MIN: CPT | Mod: S$GLB,,, | Performed by: ALLERGY & IMMUNOLOGY

## 2017-05-09 PROCEDURE — 99999 PR PBB SHADOW E&M-EST. PATIENT-LVL III: CPT | Mod: PBBFAC,,, | Performed by: ALLERGY & IMMUNOLOGY

## 2017-05-09 PROCEDURE — 1160F RVW MEDS BY RX/DR IN RCRD: CPT | Mod: S$GLB,,, | Performed by: ALLERGY & IMMUNOLOGY

## 2017-05-09 RX ORDER — AZELASTINE 1 MG/ML
1-2 SPRAY, METERED NASAL 2 TIMES DAILY PRN
Qty: 30 ML | Refills: 5 | Status: SHIPPED | OUTPATIENT
Start: 2017-05-09

## 2017-05-09 NOTE — PROGRESS NOTES
"Fredo Reeves" Reyes returns to clinic today for continued evaluation of chronic rhinitis and asthma. He is here alone. He was last seen April 25, 2017.    Since his last visit, he has continued to have symptoms that are not completely controlled.    He continues to have some rhinitis and conjunctivitis despite using Flonase, Singulair, and Allegra daily.    He continues to take Advair twice a day. He occasionally needs albuterol particularly with exercise.    His mother is in allergist in [place] that is currently not practicing. He has been told in the past that he had dust mite allergy. He has done extensive house dust mite avoidance in his house.    He has not tried azelastine.    He continues to have uncontrolled reflux with symptoms almost every day. He is not currently taking any PPI.    He is using saline nasal washes with some improvement.    He has septoplasty, turbinate reduction, FESS, and adenoidectomy by Dr. Tolbert on March 27, 2017. He saw him in follow-up today and was told that he was doing well.    He works for [employer] and they just received another project. He thinks he will be in [place] to the end of the year. He is not sure about location afterwards.    [facility] PEQ ALLERGY QUESTIONNAIRE SHORT 5/9/2017   Are you taking any new medications since your last visit? Yes   Constitution: No symptoms   Head or facial pain: Headaches, Sinus pressure   Eyes: Itching   Ears: Fullness   Nose: Post nasal drip   Throat: Itching   Sinuses: No symptoms   Lungs: Wheezing   Skin: Itching, Redness   Cardiovascular: No symptoms   Gastrointestinal: Heartburn/ indigestion/ reflux   Genital/ urinary No symptoms   Musculoskeletal: Neck pain   Neurologic: Headaches   Endocrine: No symptoms   Hematologic: No symptoms     Physical Examination:  General: Well-developed, well-nourished, no acute distress. 281 pounds.  Head: No sinus tenderness.  Eyes: Conjunctivae:  No bulbar or palpebral conjunctival injection.  Ears: EAC's clear.  " TM's clear.  No pre-auricular nodes.  Nose: Nasal Mucosa:  Pink.  Septum: No apparent deviation.  Turbinates:  No significant edema.  Polyps/Mass:  None visible.  Teeth/Gums:  No bleeding noted.  Oropharynx: No exudates.  Neck: Supple without thyromegaly. No cervical lymphadenopathy.    Respiratory/Chest: Effort: Good.  Auscultation:  Clear bilaterally.  Cardiovascular:  No murmur, rubs, or gallop heard.   GI:  Non-tender.  No masses.  No organomegaly.  Extremities:  No swelling.  No cyanosis, clubbing, or edema.  Skin: Good turgor.  No urticaria or angioedema.  Neuro/Psych: Oriented x 3.    Laboratory 4/25/2017:  IgE level: Less than 35.  ImmunoCAP:  Class II: Dust mites.    Spirometry 4/25/2017:  Normal spirometry.    Assessment:  1. Allergic rhinitis.  2. Allergic conjunctivitis.  3. Allergic asthma.  4. GERD, uncontrolled.  5. S/P septoplasty, turbinate reduction, FESS, and adenoidectomy Dr. Tolbert on March 27, 2017.    Recommendations:  1. Continue house dust mite avoidance.  2. Continue present medications.  3. Add azelastine 1-2 sprays each nostril twice a day when necessary rhinitis.  4. GI evaluation.  5. Discussed relationship between uncontrolled reflux and rhinitis and asthma.  6. Consider SLIT.    Allergic mechanisms and treatment options were reviewed in detail. Immunotherapy both SCIT and SLIT were reviewed. House dust mite avoidance was reviewed. Handouts were given.

## 2017-05-09 NOTE — PROGRESS NOTES
"  Subjective:      Fredo is a 26 y.o. male who comes for follow-up of ear fullness.  Doing well 6 weeks after SMR, BITR, adenoidectomy.  Breathing well through nose, postnasal drip markedly improved.  Using saline rinse occasionally.  Still with bilateral mild aural fullness, no known triggering elements, hearing fine.    QOL assessment deferred.    The patient's medications, allergies, past medical, surgical, social and family histories were reviewed and updated as appropriate.    A detailed review of systems was obtained with pertinent positives as per the above HPI, and otherwise negative.        Objective:     BP (!) 158/98  Pulse 105  Ht 6' 1" (1.854 m)  Wt 126.8 kg (279 lb 8.7 oz)  BMI 36.88 kg/m2       Constitutional:   He appears well-developed. He is cooperative. Normal speech.  No hoarse voice.      Head:  Normocephalic. Salivary glands normal.  Facial strength is normal.      Ears:    Right Ear: No drainage or tenderness. Tympanic membrane is not perforated. Tympanic membrane mobility is abnormal. No middle ear effusion. No decreased hearing is noted.   Left Ear: No drainage or tenderness. Tympanic membrane is not perforated. Tympanic membrane mobility is abnormal.  No middle ear effusion. No decreased hearing is noted.     Nose:  No mucosal edema, rhinorrhea, septal deviation or polyps. No epistaxis. Turbinates normal, no turbinate masses and no turbinate hypertrophy.  Right sinus exhibits no maxillary sinus tenderness and no frontal sinus tenderness. Left sinus exhibits no maxillary sinus tenderness and no frontal sinus tenderness.     Mouth/Throat  Oropharynx clear and moist without lesions or asymmetry and normal uvula midline. He does not have dentures. Normal dentition. No oral lesions or mucous membrane lesions. No oropharyngeal exudate or posterior oropharyngeal erythema. Mirror exam not performed due to patient tolerance.  Mirror exam not performed due to patient tolerance.      Neck:  Neck " normal without thyromegaly masses, asymmetry, normal tracheal structure, crepitus, and tenderness, thyroid normal, trachea normal and no adenopathy. Normal range of motion present.     He has no cervical adenopathy.     Cardiovascular:   Regular rhythm.      Pulmonary/Chest:   Effort normal.     Psychiatric:   He has a normal mood and affect. His speech is normal and behavior is normal.     Neurological:   No cranial nerve deficit.     Skin:   No rash noted.       Procedure    None        Data Reviewed    WBC (K/uL)   Date Value   04/07/2017 3.46 (L)     Eosinophil% (%)   Date Value   04/07/2017 2.3     Eos # (K/uL)   Date Value   04/07/2017 0.1     Platelets (K/uL)   Date Value   04/07/2017 214     Glucose (mg/dL)   Date Value   04/07/2017 74     IgE (IU/mL)   Date Value   04/25/2017 <35         Assessment:     1. Eustachian tube dysfunction, bilateral         Plan:     Rx Flonase Sensimist.  Return if symptoms worsen or fail to improve.

## 2017-05-24 ENCOUNTER — OFFICE VISIT (OUTPATIENT)
Dept: GASTROENTEROLOGY | Facility: CLINIC | Age: 27
End: 2017-05-24
Payer: COMMERCIAL

## 2017-05-24 VITALS
DIASTOLIC BLOOD PRESSURE: 86 MMHG | BODY MASS INDEX: 37.11 KG/M2 | SYSTOLIC BLOOD PRESSURE: 138 MMHG | HEIGHT: 73 IN | WEIGHT: 280 LBS

## 2017-05-24 DIAGNOSIS — K21.9 GASTROESOPHAGEAL REFLUX DISEASE, ESOPHAGITIS PRESENCE NOT SPECIFIED: Primary | ICD-10-CM

## 2017-05-24 PROCEDURE — 1160F RVW MEDS BY RX/DR IN RCRD: CPT | Mod: S$GLB,,, | Performed by: INTERNAL MEDICINE

## 2017-05-24 PROCEDURE — 99204 OFFICE O/P NEW MOD 45 MIN: CPT | Mod: S$GLB,,, | Performed by: INTERNAL MEDICINE

## 2017-05-24 PROCEDURE — 99999 PR PBB SHADOW E&M-EST. PATIENT-LVL II: CPT | Mod: PBBFAC,,, | Performed by: INTERNAL MEDICINE

## 2017-05-24 RX ORDER — OMEPRAZOLE 40 MG/1
40 CAPSULE, DELAYED RELEASE ORAL DAILY
Qty: 30 CAPSULE | Refills: 11 | Status: SHIPPED | OUTPATIENT
Start: 2017-05-24 | End: 2018-05-24

## 2017-05-24 NOTE — PROGRESS NOTES
Subjective:       Patient ID: Alan David Reyes is a 26 y.o. male.    Chief Complaint: Gastroesophageal Reflux    This is a 25yo male who presents for evaluation of GI symptoms. He notes the symptoms worsening over the past few months, correlating with about 50lbs of weight gain since returning to [place]. Some dysphagia to solids as well. His symptoms are severely worsened at night, now sleeping mostly upright. Good response to omeprazole 40 mg daily.  He does have a history of environmental ALLERGIES to dust mites.  No atopic conditions otherwise.  No family history of esophageal cancer.  No melena.  His never had a food impaction.  No other exacerbating or relieving factors or other associate his symptoms, they are severe in intensity.    The following portions of the patient's history were reviewed and updated as appropriate: allergies, current medications, past family history, past medical history, past social history, past surgical history and problem list.    (Portions of this note were dictated using voice recognition software and may contain dictation related errors in spelling/grammar/syntax not found on text review)    HPI  Review of Systems   Constitutional: Negative for chills and fever.   HENT: Positive for trouble swallowing. Negative for postnasal drip.    Eyes: Negative for pain and visual disturbance.   Respiratory: Negative for cough, choking and shortness of breath.    Cardiovascular: Negative for chest pain and leg swelling.   Gastrointestinal: Positive for diarrhea. Negative for abdominal distention, blood in stool, nausea and vomiting.   Endocrine: Negative for cold intolerance and heat intolerance.   Genitourinary: Negative for difficulty urinating and hematuria.   Allergic/Immunologic: Positive for environmental allergies.   Neurological: Negative for dizziness and numbness.   Hematological: Negative for adenopathy. Does not bruise/bleed easily.   Psychiatric/Behavioral: Negative for  agitation and confusion.       Objective:      Physical Exam   Constitutional: He is oriented to person, place, and time. He appears well-developed and well-nourished. No distress.   HENT:   Head: Normocephalic.   Eyes: Conjunctivae are normal. No scleral icterus.   Neck: No tracheal deviation present. No thyromegaly present.   Cardiovascular: Normal rate, regular rhythm and normal heart sounds.  Exam reveals no gallop and no friction rub.    No murmur heard.  Pulmonary/Chest: Effort normal and breath sounds normal. He has no wheezes. He has no rales.   Abdominal: Soft. Bowel sounds are normal. He exhibits no distension. There is no tenderness. There is no rebound and no guarding.   Musculoskeletal: Normal range of motion. He exhibits no edema or tenderness.   Neurological: He is alert and oriented to person, place, and time.   Skin: He is not diaphoretic.   Psychiatric: He has a normal mood and affect. His behavior is normal.       Assessment:       1. Gastroesophageal reflux disease, esophagitis presence not specified        Plan:   1. PPI daily  2. EGD, evaluation for EoE

## 2017-06-06 ENCOUNTER — HOSPITAL ENCOUNTER (OUTPATIENT)
Facility: HOSPITAL | Age: 27
Discharge: HOME | End: 2017-06-06
Attending: INTERNAL MEDICINE | Admitting: INTERNAL MEDICINE
Payer: COMMERCIAL

## 2017-06-06 ENCOUNTER — HOSPITAL ENCOUNTER (OUTPATIENT)
Dept: RADIOLOGY | Facility: HOSPITAL | Age: 27
Discharge: HOME | End: 2017-06-06
Attending: INTERNAL MEDICINE | Admitting: INTERNAL MEDICINE
Payer: COMMERCIAL

## 2017-06-06 ENCOUNTER — SURGERY (OUTPATIENT)
Age: 27
End: 2017-06-06

## 2017-06-06 ENCOUNTER — ANESTHESIA EVENT (OUTPATIENT)
Dept: ENDOSCOPY | Facility: HOSPITAL | Age: 27
End: 2017-06-06
Payer: COMMERCIAL

## 2017-06-06 ENCOUNTER — ANESTHESIA (OUTPATIENT)
Dept: ENDOSCOPY | Facility: HOSPITAL | Age: 27
End: 2017-06-06
Payer: COMMERCIAL

## 2017-06-06 DIAGNOSIS — K21.9 GERD (GASTROESOPHAGEAL REFLUX DISEASE): ICD-10-CM

## 2017-06-06 DIAGNOSIS — K21.9 GASTROESOPHAGEAL REFLUX DISEASE, ESOPHAGITIS PRESENCE NOT SPECIFIED: Primary | ICD-10-CM

## 2017-06-06 PROCEDURE — 71020 XR CHEST PA AND LATERAL: CPT | Mod: TC

## 2017-06-06 PROCEDURE — 25500020 PHARM REV CODE 255: Performed by: INTERNAL MEDICINE

## 2017-06-06 PROCEDURE — 37000008 HC ANESTHESIA 1ST 15 MINUTES: Performed by: INTERNAL MEDICINE

## 2017-06-06 PROCEDURE — 71020 XR CHEST PA AND LATERAL: CPT | Mod: 26,,, | Performed by: RADIOLOGY

## 2017-06-06 PROCEDURE — 43239 EGD BIOPSY SINGLE/MULTIPLE: CPT | Mod: ,,, | Performed by: INTERNAL MEDICINE

## 2017-06-06 PROCEDURE — 25000003 PHARM REV CODE 250: Performed by: INTERNAL MEDICINE

## 2017-06-06 PROCEDURE — 43239 EGD BIOPSY SINGLE/MULTIPLE: CPT | Performed by: INTERNAL MEDICINE

## 2017-06-06 PROCEDURE — 88305 TISSUE EXAM BY PATHOLOGIST: CPT | Mod: 26,,, | Performed by: PATHOLOGY

## 2017-06-06 PROCEDURE — 27201012 HC FORCEPS, HOT/COLD, DISP: Performed by: INTERNAL MEDICINE

## 2017-06-06 PROCEDURE — 88305 TISSUE EXAM BY PATHOLOGIST: CPT | Performed by: PATHOLOGY

## 2017-06-06 PROCEDURE — 25000003 PHARM REV CODE 250: Performed by: NURSE ANESTHETIST, CERTIFIED REGISTERED

## 2017-06-06 PROCEDURE — 37000009 HC ANESTHESIA EA ADD 15 MINS: Performed by: INTERNAL MEDICINE

## 2017-06-06 PROCEDURE — 63600175 PHARM REV CODE 636 W HCPCS: Performed by: NURSE ANESTHETIST, CERTIFIED REGISTERED

## 2017-06-06 RX ORDER — PROPOFOL 10 MG/ML
VIAL (ML) INTRAVENOUS CONTINUOUS PRN
Status: DISCONTINUED | OUTPATIENT
Start: 2017-06-06 | End: 2017-06-06

## 2017-06-06 RX ORDER — LIDOCAINE HCL/PF 100 MG/5ML
SYRINGE (ML) INTRAVENOUS
Status: DISCONTINUED | OUTPATIENT
Start: 2017-06-06 | End: 2017-06-06

## 2017-06-06 RX ORDER — PROPOFOL 10 MG/ML
VIAL (ML) INTRAVENOUS
Status: DISCONTINUED | OUTPATIENT
Start: 2017-06-06 | End: 2017-06-06

## 2017-06-06 RX ORDER — SODIUM CHLORIDE 9 MG/ML
INJECTION, SOLUTION INTRAVENOUS CONTINUOUS
Status: DISCONTINUED | OUTPATIENT
Start: 2017-06-06 | End: 2017-06-06 | Stop reason: HOSPADM

## 2017-06-06 RX ADMIN — PROPOFOL 60 MG: 10 INJECTION, EMULSION INTRAVENOUS at 11:06

## 2017-06-06 RX ADMIN — PROPOFOL 150 MCG/KG/MIN: 10 INJECTION, EMULSION INTRAVENOUS at 11:06

## 2017-06-06 RX ADMIN — TOPICAL ANESTHETIC 1 EACH: 200 SPRAY DENTAL; PERIODONTAL at 11:06

## 2017-06-06 RX ADMIN — SODIUM CHLORIDE: 0.9 INJECTION, SOLUTION INTRAVENOUS at 10:06

## 2017-06-06 RX ADMIN — LIDOCAINE HYDROCHLORIDE 40 MG: 20 INJECTION, SOLUTION INTRAVENOUS at 11:06

## 2017-06-06 RX ADMIN — IOHEXOL 150 ML: 350 INJECTION, SOLUTION INTRAVENOUS at 01:06

## 2017-06-06 NOTE — H&P (VIEW-ONLY)
Subjective:       Patient ID: Alan David Reyes is a 26 y.o. male.    Chief Complaint: Gastroesophageal Reflux    This is a 27yo male who presents for evaluation of GI symptoms. He notes the symptoms worsening over the past few months, correlating with about 50lbs of weight gain since returning to [place]. Some dysphagia to solids as well. His symptoms are severely worsened at night, now sleeping mostly upright. Good response to omeprazole 40 mg daily.  He does have a history of environmental ALLERGIES to dust mites.  No atopic conditions otherwise.  No family history of esophageal cancer.  No melena.  His never had a food impaction.  No other exacerbating or relieving factors or other associate his symptoms, they are severe in intensity.    The following portions of the patient's history were reviewed and updated as appropriate: allergies, current medications, past family history, past medical history, past social history, past surgical history and problem list.    (Portions of this note were dictated using voice recognition software and may contain dictation related errors in spelling/grammar/syntax not found on text review)    HPI  Review of Systems   Constitutional: Negative for chills and fever.   HENT: Positive for trouble swallowing. Negative for postnasal drip.    Eyes: Negative for pain and visual disturbance.   Respiratory: Negative for cough, choking and shortness of breath.    Cardiovascular: Negative for chest pain and leg swelling.   Gastrointestinal: Positive for diarrhea. Negative for abdominal distention, blood in stool, nausea and vomiting.   Endocrine: Negative for cold intolerance and heat intolerance.   Genitourinary: Negative for difficulty urinating and hematuria.   Allergic/Immunologic: Positive for environmental allergies.   Neurological: Negative for dizziness and numbness.   Hematological: Negative for adenopathy. Does not bruise/bleed easily.   Psychiatric/Behavioral: Negative for  agitation and confusion.       Objective:      Physical Exam   Constitutional: He is oriented to person, place, and time. He appears well-developed and well-nourished. No distress.   HENT:   Head: Normocephalic.   Eyes: Conjunctivae are normal. No scleral icterus.   Neck: No tracheal deviation present. No thyromegaly present.   Cardiovascular: Normal rate, regular rhythm and normal heart sounds.  Exam reveals no gallop and no friction rub.    No murmur heard.  Pulmonary/Chest: Effort normal and breath sounds normal. He has no wheezes. He has no rales.   Abdominal: Soft. Bowel sounds are normal. He exhibits no distension. There is no tenderness. There is no rebound and no guarding.   Musculoskeletal: Normal range of motion. He exhibits no edema or tenderness.   Neurological: He is alert and oriented to person, place, and time.   Skin: He is not diaphoretic.   Psychiatric: He has a normal mood and affect. His behavior is normal.       Assessment:       1. Gastroesophageal reflux disease, esophagitis presence not specified        Plan:   1. PPI daily  2. EGD, evaluation for EoE

## 2017-06-06 NOTE — PROGRESS NOTES
Patient returned from radiology    Complains of pain of 4    He says his pain is better.    Waiting for further orders from doctor

## 2017-06-06 NOTE — TRANSFER OF CARE
Anesthesia Transfer of Care Note    Patient: Alan David Reyes    Procedure(s) Performed: Procedure(s) (LRB):  ESOPHAGOGASTRODUODENOSCOPY (EGD) (N/A)    Patient location: GI    Anesthesia Type: MAC    Transport from OR: Transported from OR on room air with adequate spontaneous ventilation    Post pain: adequate analgesia    Post assessment: no apparent anesthetic complications and tolerated procedure well    Post vital signs: stable    Level of consciousness: awake, alert and oriented    Nausea/Vomiting: no nausea/vomiting    Complications: none    Transfer of care protocol was followed      Last vitals:   Visit Vitals  /83   Pulse 80   Temp 36.9 °C (98.4 °F)   Resp 18   Ht 6' (1.829 m)   Wt 127 kg (280 lb)   SpO2 97%   BMI 37.97 kg/m²

## 2017-06-06 NOTE — ANESTHESIA PREPROCEDURE EVALUATION
06/06/2017  Alan David Reyes is a 26 y.o., male.    Anesthesia Evaluation      I have reviewed the Medications.     Review of Systems  Anesthesia Hx:  No problems with previous Anesthesia Denies Hx of Anesthetic complications History of prior surgery of interest to airway management or planning: Previous anesthesia: General Denies Family Hx of Anesthesia complications.   Denies Personal Hx of Anesthesia complications.   Social:  Non-Smoker, No Alcohol Use    Hematology/Oncology:  Hematology Normal   Oncology Normal     EENT/Dental:EENT/Dental Normal   Cardiovascular:  Cardiovascular Normal Exercise tolerance: good     Pulmonary:  Pulmonary Normal    Renal/:  Renal/ Normal     Hepatic/GI:  Hepatic/GI Normal    Musculoskeletal:  Musculoskeletal Normal    Neurological:  Neurology Normal    Endocrine:  Endocrine Normal    Dermatological:  Skin Normal    Psych:  Psychiatric Normal           Physical Exam  General:  Obesity    Airway/Jaw/Neck:  Airway Findings: Mouth Opening: Normal Tongue: Normal  General Airway Assessment: Adult  Mallampati: II      Dental:  Dental Findings: In tact   Chest/Lungs:  Chest/Lungs Findings: Clear to auscultation, Normal Respiratory Rate     Heart/Vascular:  Heart Findings: Rate: Normal  Rhythm: Regular Rhythm        Mental Status:  Mental Status Findings:  Cooperative, Alert and Oriented         Anesthesia Plan  Type of Anesthesia, risks & benefits discussed:  Anesthesia Type:  MAC  Patient's Preference: MAC  Intra-op Monitoring Plan:   Intra-op Monitoring Plan Comments:   Post Op Pain Control Plan:   Post Op Pain Control Plan Comments:   Induction:   IV  Beta Blocker:         Informed Consent: Patient understands risks and agrees with Anesthesia plan.  Questions answered. Anesthesia consent signed with patient.  ASA Score: 2     Day of Surgery Review of History & Physical:             Ready For Surgery From Anesthesia Perspective.

## 2017-06-06 NOTE — PLAN OF CARE
Patient complains of pain in abdomen   Dr jacobson ordered a barium swallow study now for patient    Patient waiting for transport

## 2017-06-06 NOTE — ANESTHESIA POSTPROCEDURE EVALUATION
Anesthesia Post Evaluation    Patient: Alan David Reyes    Procedure(s) Performed: Procedure(s) (LRB):  ESOPHAGOGASTRODUODENOSCOPY (EGD) (N/A)    Final Anesthesia Type: MAC  Patient location during evaluation: GI PACU  Patient participation: Yes- Able to Participate  Level of consciousness: awake and alert and oriented  Post-procedure vital signs: reviewed and stable  Pain management: adequate  Airway patency: patent  PONV status at discharge: No PONV  Anesthetic complications: no      Cardiovascular status: blood pressure returned to baseline, hemodynamically stable and stable  Respiratory status: unassisted, room air and spontaneous ventilation  Hydration status: euvolemic  Follow-up not needed.        Visit Vitals  /83   Pulse 80   Temp 36.9 °C (98.4 °F)   Resp 18   Ht 6' (1.829 m)   Wt 127 kg (280 lb)   SpO2 97%   BMI 37.97 kg/m²       Pain/Tamir Score: No Data Recorded

## 2017-06-06 NOTE — DISCHARGE INSTRUCTIONS
Post EGD Discharge Instruction    Fredon David Reyes  6/6/2017  Nydia Montes MD    RESTRICTIONS ON ACTIVITY:    -DO NOT drive a car or operate machinery until the day after procedure.  -Following Day: Return to full activities including work.  -Diet: Eat and drink normally unless instructed otherwise.    TREATMENT FOR COMMON SIDE EFFECTS:  *Sore Throat - treat with throat lozenges, gargle with warm salt water.  *Mild abdominal pain & bloating- rest and take liquids only.    SYMPTOMS TO WATCH FOR AND REPORT TO YOUR PHYSICIAN:  1. Chills or fever occurring 24 hours after procedure.  2. Pain in chest.  3. SEVERE abdominal pain or bloating.  4. Rectal bleeding which could be maroon or black.    If you have any questions or problems, please call your Physician:    Nydia Montes MD Phone: ***    Lab Results: [phone]    If a complication or emergency situation arises and you are unable to reach your Physician - GO TO THE EMERGENCY ROOM.

## 2017-06-07 ENCOUNTER — OFFICE VISIT (OUTPATIENT)
Dept: SLEEP MEDICINE | Facility: CLINIC | Age: 27
End: 2017-06-07
Payer: COMMERCIAL

## 2017-06-07 VITALS
HEIGHT: 72 IN | WEIGHT: 281.75 LBS | BODY MASS INDEX: 38.16 KG/M2 | HEART RATE: 83 BPM | SYSTOLIC BLOOD PRESSURE: 129 MMHG | DIASTOLIC BLOOD PRESSURE: 76 MMHG

## 2017-06-07 DIAGNOSIS — G47.33 OSA (OBSTRUCTIVE SLEEP APNEA): Primary | ICD-10-CM

## 2017-06-07 DIAGNOSIS — R09.81 CHRONIC NASAL CONGESTION: ICD-10-CM

## 2017-06-07 DIAGNOSIS — F51.02 ADJUSTMENT INSOMNIA: ICD-10-CM

## 2017-06-07 PROCEDURE — 99214 OFFICE O/P EST MOD 30 MIN: CPT | Mod: S$GLB,,, | Performed by: NURSE PRACTITIONER

## 2017-06-07 PROCEDURE — 99999 PR PBB SHADOW E&M-EST. PATIENT-LVL IV: CPT | Mod: PBBFAC,,, | Performed by: NURSE PRACTITIONER

## 2017-06-07 RX ORDER — ZALEPLON 5 MG/1
5 CAPSULE ORAL NIGHTLY PRN
Qty: 30 CAPSULE | Refills: 0 | Status: SHIPPED | OUTPATIENT
Start: 2017-06-07 | End: 2017-07-07
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
## 2017-06-08 VITALS
RESPIRATION RATE: 18 BRPM | SYSTOLIC BLOOD PRESSURE: 125 MMHG | BODY MASS INDEX: 37.93 KG/M2 | WEIGHT: 280 LBS | HEART RATE: 78 BPM | HEIGHT: 72 IN | OXYGEN SATURATION: 98 % | DIASTOLIC BLOOD PRESSURE: 54 MMHG | TEMPERATURE: 98 F

## 2017-06-16 ENCOUNTER — TELEPHONE (OUTPATIENT)
Dept: GASTROENTEROLOGY | Facility: CLINIC | Age: 27
End: 2017-06-16

## 2017-06-16 NOTE — TELEPHONE ENCOUNTER
Results given to patient. Patient verbalized understanding. Patient will schedule follow up visit via patient portal.

## 2017-06-16 NOTE — TELEPHONE ENCOUNTER
----- Message from Nydia Montes MD sent at 6/16/2017  1:53 PM CDT -----  We can let him know that the distal esophageal bx showed inflammation relative to the proximal, this is more c/w reflux. Gastric bx ok. If he still has symptoms I would change his omeprazole to zegerid and see him in 4 weeks to f/u

## 2017-08-03 RX ORDER — MONTELUKAST SODIUM 10 MG/1
TABLET ORAL
Qty: 30 TABLET | Refills: 5 | Status: SHIPPED | OUTPATIENT
Start: 2017-08-03 | End: 2017-12-29 | Stop reason: SDUPTHER

## 2017-08-14 ENCOUNTER — PATIENT MESSAGE (OUTPATIENT)
Dept: GASTROENTEROLOGY | Facility: CLINIC | Age: 27
End: 2017-08-14

## 2017-08-14 ENCOUNTER — TELEPHONE (OUTPATIENT)
Dept: GASTROENTEROLOGY | Facility: CLINIC | Age: 27
End: 2017-08-14

## 2017-08-15 ENCOUNTER — OFFICE VISIT (OUTPATIENT)
Dept: DERMATOLOGY | Facility: CLINIC | Age: 27
End: 2017-08-15
Payer: COMMERCIAL

## 2017-08-15 DIAGNOSIS — L71.9 ROSACEA: ICD-10-CM

## 2017-08-15 DIAGNOSIS — L85.8 KERATOSIS PILARIS: ICD-10-CM

## 2017-08-15 DIAGNOSIS — L73.1 PSEUDOFOLLICULITIS BARBAE: ICD-10-CM

## 2017-08-15 DIAGNOSIS — L64.9 ANDROGENETIC ALOPECIA: Primary | ICD-10-CM

## 2017-08-15 PROCEDURE — 99203 OFFICE O/P NEW LOW 30 MIN: CPT | Mod: S$GLB,,, | Performed by: DERMATOLOGY

## 2017-08-15 PROCEDURE — 3008F BODY MASS INDEX DOCD: CPT | Mod: S$GLB,,, | Performed by: DERMATOLOGY

## 2017-08-15 PROCEDURE — 99999 PR PBB SHADOW E&M-EST. PATIENT-LVL II: CPT | Mod: PBBFAC,,, | Performed by: DERMATOLOGY
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
## 2017-08-16 ENCOUNTER — OFFICE VISIT (OUTPATIENT)
Dept: GASTROENTEROLOGY | Facility: CLINIC | Age: 27
End: 2017-08-16
Payer: COMMERCIAL

## 2017-08-16 VITALS
DIASTOLIC BLOOD PRESSURE: 84 MMHG | HEART RATE: 73 BPM | SYSTOLIC BLOOD PRESSURE: 132 MMHG | BODY MASS INDEX: 38.07 KG/M2 | WEIGHT: 281.06 LBS | HEIGHT: 72 IN

## 2017-08-16 DIAGNOSIS — K21.9 GASTROESOPHAGEAL REFLUX DISEASE, ESOPHAGITIS PRESENCE NOT SPECIFIED: Primary | ICD-10-CM

## 2017-08-16 PROCEDURE — 99212 OFFICE O/P EST SF 10 MIN: CPT | Mod: S$GLB,,, | Performed by: INTERNAL MEDICINE

## 2017-08-16 PROCEDURE — 3008F BODY MASS INDEX DOCD: CPT | Mod: S$GLB,,, | Performed by: INTERNAL MEDICINE

## 2017-08-16 PROCEDURE — 99999 PR PBB SHADOW E&M-EST. PATIENT-LVL II: CPT | Mod: PBBFAC,,, | Performed by: INTERNAL MEDICINE

## 2017-08-16 RX ORDER — SUCRALFATE 1 G/10ML
1 SUSPENSION ORAL 4 TIMES DAILY PRN
Qty: 414 ML | Refills: 1 | Status: SHIPPED | OUTPATIENT
Start: 2017-08-16

## 2017-08-16 NOTE — PROGRESS NOTES
Subjective:       Patient ID: Alan David Reyes is a 26 y.o. male.    Chief Complaint: Follow-up    This is a 26 for melena presents for a follow-up visit.  His softer biopsies were consistent with reflux disease.  He has begun to exercise in the mornings and has lost 10 pounds intentionally associated with a change in diet.  He notes fatty foods and tomato sauce to worsen his reflux symptoms.  He has gone from taking PPI daily as needed about 4 times a week.  No changes in bowel habits.  Occasionally though, without recognizing reflux symptoms they will not respond as well to PPI.  However taking them before triggering meals is very effective.  Tums with limited response as well.    The following portions of the patient's history were reviewed and updated as appropriate: allergies, current medications, past family history, past medical history, past social history, past surgical history and problem list.    (Portions of this note were dictated using voice recognition software and may contain dictation related errors in spelling/grammar/syntax not found on text review)    HPI  Review of Systems   HENT: Negative for sore throat and trouble swallowing.    Gastrointestinal: Negative for abdominal distention and abdominal pain.       Objective:      Physical Exam   Constitutional: He appears well-developed and well-nourished. No distress.   Eyes: Conjunctivae are normal. No scleral icterus.   Pulmonary/Chest: Effort normal. No respiratory distress.   Nursing note and vitals reviewed.      Assessment:       1. Gastroesophageal reflux disease, esophagitis presence not specified        Plan:   1. Carafate as needed  2. PPI as needed  3. He's doing excellent with changing his diet, began exercising in the mornings. His improvement in symptoms has correlated to his weight loss  4. He will call/message as needed

## 2017-08-17 DIAGNOSIS — L64.9 ANDROGENETIC ALOPECIA: Primary | ICD-10-CM

## 2017-08-17 RX ORDER — FINASTERIDE 1 MG/1
1 TABLET, FILM COATED ORAL DAILY
Qty: 30 TABLET | Refills: 3 | Status: SHIPPED | OUTPATIENT
Start: 2017-08-17 | End: 2017-09-16

## 2017-12-18 ENCOUNTER — OFFICE VISIT (OUTPATIENT)
Dept: FAMILY MEDICINE | Facility: CLINIC | Age: 27
End: 2017-12-18
Attending: FAMILY MEDICINE
Payer: COMMERCIAL

## 2017-12-18 VITALS
OXYGEN SATURATION: 97 % | TEMPERATURE: 99 F | SYSTOLIC BLOOD PRESSURE: 146 MMHG | BODY MASS INDEX: 37.47 KG/M2 | WEIGHT: 276.63 LBS | HEART RATE: 90 BPM | HEIGHT: 72 IN | DIASTOLIC BLOOD PRESSURE: 88 MMHG

## 2017-12-18 DIAGNOSIS — J30.89 ALLERGIC RHINITIS DUE TO DUST MITE: ICD-10-CM

## 2017-12-18 DIAGNOSIS — J01.00 ACUTE MAXILLARY SINUSITIS, RECURRENCE NOT SPECIFIED: Primary | ICD-10-CM

## 2017-12-18 PROCEDURE — 96372 THER/PROPH/DIAG INJ SC/IM: CPT | Mod: S$GLB,,, | Performed by: FAMILY MEDICINE

## 2017-12-18 PROCEDURE — 99999 PR PBB SHADOW E&M-EST. PATIENT-LVL III: CPT | Mod: PBBFAC,,, | Performed by: FAMILY MEDICINE

## 2017-12-18 PROCEDURE — 99214 OFFICE O/P EST MOD 30 MIN: CPT | Mod: 25,S$GLB,, | Performed by: FAMILY MEDICINE

## 2017-12-18 RX ORDER — METHYLPREDNISOLONE ACETATE 40 MG/ML
40 INJECTION, SUSPENSION INTRA-ARTICULAR; INTRALESIONAL; INTRAMUSCULAR; SOFT TISSUE
Status: COMPLETED | OUTPATIENT
Start: 2017-12-18 | End: 2017-12-18

## 2017-12-18 RX ORDER — AMOXICILLIN AND CLAVULANATE POTASSIUM 875; 125 MG/1; MG/1
1 TABLET, FILM COATED ORAL 2 TIMES DAILY
Qty: 20 TABLET | Refills: 0 | Status: SHIPPED | OUTPATIENT
Start: 2017-12-18 | End: 2017-12-29 | Stop reason: ALTCHOICE

## 2017-12-18 RX ADMIN — METHYLPREDNISOLONE ACETATE 40 MG: 40 INJECTION, SUSPENSION INTRA-ARTICULAR; INTRALESIONAL; INTRAMUSCULAR; SOFT TISSUE at 02:12

## 2017-12-18 NOTE — PROGRESS NOTES
Subjective:       Alan David Reyes is a 27 y.o. male who presents for evaluation of sinus pain. Symptoms include: fevers, nasal congestion, purulent rhinorrhea, sinus pressure, sneezing, sniffing and sore throat. Onset of symptoms was 2 days ago. Symptoms have been gradually worsening since that time. Past history is significant for allergic rhinitis. Patient is a non-smoker.    Patient Active Problem List   Diagnosis    Allergic rhinitis due to dust mite    Mild intermittent asthma    ALEX (obstructive sleep apnea)    Chronic nasal congestion    Adenoid hypertrophy    Asthma in adult    GERD (gastroesophageal reflux disease)       Current Outpatient Prescriptions:     albuterol (PROVENTIL) 2.5 mg /3 mL (0.083 %) nebulizer solution, Take 3 mLs (2.5 mg total) by nebulization every 6 (six) hours as needed for Wheezing. Rescue, Disp: 75 mL, Rfl: 2    ALBUTEROL INHL, Inhale into the lungs., Disp: , Rfl:     azelastine (ASTELIN) 137 mcg (0.1 %) nasal spray, 1-2 sprays (137-274 mcg total) by Nasal route 2 (two) times daily as needed for Rhinitis., Disp: 30 mL, Rfl: 5    fexofenadine (ALLEGRA) 180 MG tablet, Take 180 mg by mouth once daily., Disp: , Rfl:     fluticasone (FLONASE) 50 mcg/actuation nasal spray, 1 spray by Each Nare route once daily., Disp: , Rfl:     fluticasone-salmeterol 250-50 mcg/dose (ADVAIR) 250-50 mcg/dose diskus inhaler, Inhale 1 puff into the lungs 2 (two) times daily., Disp: , Rfl:     MIRVASO 0.33 % Gel, AAA face qd, Disp: 30 g, Rfl: 3    montelukast (SINGULAIR) 10 mg tablet, TAKE 1 TABLET (10 MG TOTAL) BY MOUTH EVERY EVENING., Disp: 30 tablet, Rfl: 5    omeprazole (PRILOSEC) 40 MG capsule, Take 1 capsule (40 mg total) by mouth once daily., Disp: 30 capsule, Rfl: 11    sucralfate (CARAFATE) 100 mg/mL suspension, Take 10 mLs (1 g total) by mouth 4 (four) times daily as needed., Disp: 414 mL, Rfl: 1    The following portions of the patient's history were reviewed and updated as  appropriate: allergies, past family history, past medical history, past social history and past surgical history.    Review of Systems  Answers for HPI/ROS submitted by the patient on 12/18/2017   activity change: No  unexpected weight change: No  neck pain: No  hearing loss: No  rhinorrhea: Yes  trouble swallowing: No  eye discharge: No  visual disturbance: No  chest tightness: Yes  wheezing: Yes  chest pain: No  palpitations: No  blood in stool: No  constipation: No  vomiting: No  diarrhea: No  polydipsia: No  polyuria: No  difficulty urinating: No  urgency: No  hematuria: No  joint swelling: No  arthralgias: No  headaches: Yes  weakness: No  confusion: No  dysphoric mood: No      Objective:      BP (!) 146/88 (BP Location: Right arm, Patient Position: Sitting, BP Method: Large (Manual))   Pulse 90   Temp 98.6 °F (37 °C) (Oral)   Ht 6' (1.829 m)   Wt 125.5 kg (276 lb 9.6 oz)   SpO2 97%   BMI 37.51 kg/m²   General appearance: alert, appears stated age and cooperative  Head: Normocephalic, without obvious abnormality, atraumatic  Ears: abnormal TM right ear - diminished mobility, erythematous and bulging and abnormal TM left ear - diminished mobility, erythematous and bulging  Nose: turbinates pink, swollen.  Right maxillary sinus very tebder.  Throat: abnormal findings: moderate oropharyngeal erythema  Neck: no adenopathy, no carotid bruit, no JVD, supple, symmetrical, trachea midline and thyroid not enlarged, symmetric, no tenderness/mass/nodules  Lungs: clear to auscultation bilaterally  Heart: regular rate and rhythm, S1, S2 normal, no murmur, click, rub or gallop      Assessment:      Acute allergic sinusitis   Eustachian tube dysfunction          Plan:      Nasal saline sprays.  Nasal steroids per medication orders.  Augmentin per medication orders.  DayQuil/NyQuil/.    DepoMedrol 40mg IM today.

## 2017-12-18 NOTE — PROGRESS NOTES
Patient was given Depo Medrol IM injection in the Left Ventrogluteal as per orders from Dr. Putnam. Aseptic technique was used. Patient was monitored for 15 mins with no reaction noted. Patient tolerated well.

## 2017-12-29 ENCOUNTER — OFFICE VISIT (OUTPATIENT)
Dept: FAMILY MEDICINE | Facility: CLINIC | Age: 27
End: 2017-12-29
Attending: FAMILY MEDICINE
Payer: COMMERCIAL

## 2017-12-29 ENCOUNTER — LAB VISIT (OUTPATIENT)
Dept: LAB | Facility: HOSPITAL | Age: 27
End: 2017-12-29
Attending: FAMILY MEDICINE
Payer: COMMERCIAL

## 2017-12-29 VITALS
HEIGHT: 72 IN | WEIGHT: 278.19 LBS | RESPIRATION RATE: 16 BRPM | SYSTOLIC BLOOD PRESSURE: 120 MMHG | OXYGEN SATURATION: 97 % | BODY MASS INDEX: 37.68 KG/M2 | TEMPERATURE: 98 F | DIASTOLIC BLOOD PRESSURE: 80 MMHG | HEART RATE: 77 BPM

## 2017-12-29 DIAGNOSIS — J30.89 ALLERGIC RHINITIS DUE TO DUST MITE: ICD-10-CM

## 2017-12-29 DIAGNOSIS — G47.33 OSA ON CPAP: ICD-10-CM

## 2017-12-29 DIAGNOSIS — K21.9 GASTROESOPHAGEAL REFLUX DISEASE, ESOPHAGITIS PRESENCE NOT SPECIFIED: ICD-10-CM

## 2017-12-29 DIAGNOSIS — J45.30 MILD PERSISTENT ASTHMA IN ADULT WITHOUT COMPLICATION: ICD-10-CM

## 2017-12-29 DIAGNOSIS — R74.01 ELEVATED TRANSAMINASE LEVEL: ICD-10-CM

## 2017-12-29 DIAGNOSIS — E78.5 DYSLIPIDEMIA: ICD-10-CM

## 2017-12-29 DIAGNOSIS — J45.21 MILD INTERMITTENT ASTHMA WITH ACUTE EXACERBATION: ICD-10-CM

## 2017-12-29 DIAGNOSIS — Z00.00 ROUTINE GENERAL MEDICAL EXAMINATION AT A HEALTH CARE FACILITY: Primary | ICD-10-CM

## 2017-12-29 PROBLEM — J35.2 ADENOID HYPERTROPHY: Status: RESOLVED | Noted: 2017-03-27 | Resolved: 2017-12-29

## 2017-12-29 LAB
ALBUMIN SERPL BCP-MCNC: 4.2 G/DL
ALP SERPL-CCNC: 77 U/L
ALT SERPL W/O P-5'-P-CCNC: 83 U/L
ANION GAP SERPL CALC-SCNC: 10 MMOL/L
AST SERPL-CCNC: 34 U/L
BILIRUB SERPL-MCNC: 0.9 MG/DL
BUN SERPL-MCNC: 17 MG/DL
CALCIUM SERPL-MCNC: 9.8 MG/DL
CHLORIDE SERPL-SCNC: 104 MMOL/L
CHOLEST SERPL-MCNC: 205 MG/DL
CHOLEST/HDLC SERPL: 5.5 {RATIO}
CO2 SERPL-SCNC: 25 MMOL/L
CREAT SERPL-MCNC: 1 MG/DL
EST. GFR  (AFRICAN AMERICAN): >60 ML/MIN/1.73 M^2
EST. GFR  (NON AFRICAN AMERICAN): >60 ML/MIN/1.73 M^2
GGT SERPL-CCNC: 45 U/L
GLUCOSE SERPL-MCNC: 85 MG/DL
HDLC SERPL-MCNC: 37 MG/DL
HDLC SERPL: 18 %
INSULIN COLLECTION INTERVAL: NORMAL
INSULIN SERPL-ACNC: 10.1 UU/ML
LDLC SERPL CALC-MCNC: 135.6 MG/DL
NONHDLC SERPL-MCNC: 168 MG/DL
POTASSIUM SERPL-SCNC: 4.1 MMOL/L
PROT SERPL-MCNC: 8 G/DL
SODIUM SERPL-SCNC: 139 MMOL/L
TRIGL SERPL-MCNC: 162 MG/DL

## 2017-12-29 PROCEDURE — 90686 IIV4 VACC NO PRSV 0.5 ML IM: CPT | Mod: S$GLB,,, | Performed by: FAMILY MEDICINE

## 2017-12-29 PROCEDURE — 80061 LIPID PANEL: CPT

## 2017-12-29 PROCEDURE — 99395 PREV VISIT EST AGE 18-39: CPT | Mod: 25,S$GLB,, | Performed by: FAMILY MEDICINE

## 2017-12-29 PROCEDURE — 87340 HEPATITIS B SURFACE AG IA: CPT

## 2017-12-29 PROCEDURE — 99999 PR PBB SHADOW E&M-EST. PATIENT-LVL IV: CPT | Mod: PBBFAC,,, | Performed by: FAMILY MEDICINE

## 2017-12-29 PROCEDURE — 82977 ASSAY OF GGT: CPT

## 2017-12-29 PROCEDURE — 80053 COMPREHEN METABOLIC PANEL: CPT

## 2017-12-29 PROCEDURE — 86803 HEPATITIS C AB TEST: CPT

## 2017-12-29 PROCEDURE — 83525 ASSAY OF INSULIN: CPT

## 2017-12-29 PROCEDURE — 90471 IMMUNIZATION ADMIN: CPT | Mod: S$GLB,,, | Performed by: FAMILY MEDICINE

## 2017-12-29 RX ORDER — MONTELUKAST SODIUM 10 MG/1
TABLET ORAL
Qty: 90 TABLET | Refills: 3 | Status: SHIPPED | OUTPATIENT
Start: 2017-12-29

## 2017-12-29 RX ORDER — FLUTICASONE PROPIONATE AND SALMETEROL 250; 50 UG/1; UG/1
1 POWDER RESPIRATORY (INHALATION) 2 TIMES DAILY
Qty: 60 EACH | Refills: 11 | Status: SHIPPED | OUTPATIENT
Start: 2017-12-29

## 2017-12-29 NOTE — PROGRESS NOTES
Subjective:       Patient ID: Alan David Reyes is a 27 y.o. male.    Chief Complaint: Annual Exam    HPI     The patient presents to the office today requesting a routine periodic health examination.  He has had visits with numerous specialists over the past year, all unbeknownst to me.  His medical/surgcial histories, and Care Team, were updated.    Patient Active Problem List   Diagnosis    Allergic rhinitis due to dust mite    Mild intermittent asthma    ALEX on CPAP    Chronic nasal congestion    Asthma in adult    GERD (gastroesophageal reflux disease)    Dyslipidemia    Elevated transaminase level       Past Surgical History:   Procedure Laterality Date    ADENOIDECTOMY  03/2017    ESOPHAGOGASTRODUODENOSCOPY  06/2017    NASAL SEPTUM SURGERY  03/2017    NASAL TURBINATE REDUCTION  03/2017    SKIN BIOPSY  2011    head/negative         Current Outpatient Prescriptions:     albuterol (PROVENTIL) 2.5 mg /3 mL (0.083 %) nebulizer solution, Take 3 mLs (2.5 mg total) by nebulization every 6 (six) hours as needed for Wheezing. Rescue, Disp: 75 mL, Rfl: 2    ALBUTEROL INHL, Inhale into the lungs., Disp: , Rfl:     azelastine (ASTELIN) 137 mcg (0.1 %) nasal spray, 1-2 sprays (137-274 mcg total) by Nasal route 2 (two) times daily as needed for Rhinitis., Disp: 30 mL, Rfl: 5    fluticasone (FLONASE) 50 mcg/actuation nasal spray, 1 spray by Each Nare route once daily., Disp: , Rfl:     fluticasone-salmeterol 250-50 mcg/dose (ADVAIR) 250-50 mcg/dose diskus inhaler, Inhale 1 puff into the lungs 2 (two) times daily., Disp: 60 each, Rfl: 11    MIRVASO 0.33 % Gel, AAA face qd, Disp: 30 g, Rfl: 3    montelukast (SINGULAIR) 10 mg tablet, TAKE 1 TABLET (10 MG TOTAL) BY MOUTH EVERY EVENING., Disp: 90 tablet, Rfl: 3    omeprazole (PRILOSEC) 40 MG capsule, Take 1 capsule (40 mg total) by mouth once daily., Disp: 30 capsule, Rfl: 11    sucralfate (CARAFATE) 100 mg/mL suspension, Take 10 mLs (1 g total) by mouth 4  "(four) times daily as needed., Disp: 414 mL, Rfl: 1    Review of patient's allergies indicates:  No Known Allergies    Family History   Problem Relation Age of Onset    Sleep apnea Father     Depression Maternal Grandmother     Diabetes Maternal Grandfather     Heart attack Maternal Grandfather     Melanoma Neg Hx        Social History     Social History    Marital status: Single     Spouse name: N/A    Number of children: 0    Years of education: N/A     Occupational History          Social History Main Topics    Smoking status: Never Smoker    Smokeless tobacco: Never Used    Alcohol use 0.0 oz/week      Comment: rare "Scotch"    Drug use: No    Sexual activity: Yes     Partners: Female     Birth control/ protection: Condom     Other Topics Concern    Not on file     Social History Narrative    The patient does exercise regularly (5 days/wk: rowing machine).      He works 60 hours per week.    Rates diet as fair to poor.      He drinks 2 coffee/tea/caffeine-containing soft drinks daily.    He drinks at least 1/2 gallon water daily.    He is not satisfied with weight.    He does wear seat belts.    Hobbies include none.                   Patient Care Team:  Dameon Putnam Jr., MD as PCP - General (Family Medicine)  Robert Tolbert MD as Nurse Practitioner (Otolaryngology)  Junior Orozco MD as Consulting Physician (Sleep Medicine)  Nydia Montes MD as Consulting Physician (Gastroenterology)  KAYLYNN Gilbert III, MD as Consulting Physician (Allergy)  Chacha Andino MD as Consulting Physician (Dermatology)    Review of Systems   Constitutional: Negative for fatigue and unexpected weight change.   HENT: Negative for ear discharge, ear pain, hearing loss, tinnitus and voice change.    Eyes: Negative for pain.   Respiratory: Negative for cough and shortness of breath.    Cardiovascular: Negative for chest pain, palpitations and leg swelling.   Gastrointestinal: Negative for " abdominal pain, blood in stool, constipation, diarrhea, nausea and vomiting.   Genitourinary: Negative for decreased urine volume, difficulty urinating, dysuria, enuresis, frequency, hematuria and urgency.   Musculoskeletal: Negative for arthralgias, back pain and myalgias.   Skin: Negative for rash.   Neurological: Negative for dizziness, weakness, light-headedness and headaches.   Hematological: Does not bruise/bleed easily.   Psychiatric/Behavioral: Negative for dysphoric mood and sleep disturbance. The patient is not nervous/anxious.          Objective:      Physical Exam   Constitutional: He is oriented to person, place, and time. He appears well-developed and well-nourished. He is cooperative.   HENT:   Head: Normocephalic and atraumatic.   Right Ear: External ear normal.   Left Ear: External ear normal.   Nose: Nose normal.   Mouth/Throat: Oropharynx is clear and moist and mucous membranes are normal. No oropharyngeal exudate.   Eyes: Conjunctivae are normal. No scleral icterus.   Neck: Neck supple. No JVD present. Carotid bruit is not present. No thyromegaly present.   Cardiovascular: Normal rate, regular rhythm, normal heart sounds and normal pulses.  Exam reveals no gallop and no friction rub.    No murmur heard.  Pulmonary/Chest: Effort normal and breath sounds normal. He has no wheezes. He has no rhonchi. He has no rales.   Abdominal: Soft. Bowel sounds are normal. He exhibits no distension and no mass. There is no splenomegaly or hepatomegaly. There is no tenderness.   Musculoskeletal: Normal range of motion. He exhibits no edema or tenderness.   Lymphadenopathy:     He has no cervical adenopathy.     He has no axillary adenopathy.   Neurological: He is alert and oriented to person, place, and time. He has normal strength and normal reflexes. No cranial nerve deficit or sensory deficit. Coordination normal.   Skin: Skin is warm and dry.   Psychiatric: He has a normal mood and affect.   Vitals  "reviewed.        Assessment:       1. Routine general medical examination at a health care facility    2. ALEX on CPAP    3. Mild intermittent asthma with acute exacerbation    4. Gastroesophageal reflux disease, esophagitis presence not specified    5. Allergic rhinitis due to dust mite    6. Mild persistent asthma in adult without complication    7. Dyslipidemia    8. Elevated transaminase level          Plan:       Labs (see Orders).  Discussed my role as care coordinator, and that all care should be directed through this office.  Discussed with patient the importance of lifestyle modifications, including well-balanced diet and moderate exercise regimen, in reducing risk for cardiovascular/cerebrovascular disease and diabetes.  Discussed routine examinations and screenings.   Flu vaccine today.  I remain concerned re: risk of CVD/DM2/fatty liver.  We will call the patient with results & make further recommendations at that time.            "This note will not be shared with the patient."  "

## 2017-12-30 LAB
HBV SURFACE AG SERPL QL IA: NEGATIVE
HCV AB SERPL QL IA: NEGATIVE

## 2018-01-01 ENCOUNTER — PATIENT MESSAGE (OUTPATIENT)
Dept: FAMILY MEDICINE | Facility: CLINIC | Age: 28
End: 2018-01-01

## 2018-04-04 ENCOUNTER — TELEPHONE (OUTPATIENT)
Dept: ALLERGY | Facility: CLINIC | Age: 28
End: 2018-04-04

## 2018-04-04 NOTE — TELEPHONE ENCOUNTER
Left message for patient to return my call. Calling to notify patient that Odactra has been approved for dust mite allergies and to see if patient is still interested in therapy.

## (undated) DEVICE — SHEET EENT SPLIT

## (undated) DEVICE — WARMER DRAPE STERILE LF

## (undated) DEVICE — SEE MEDLINE ITEM 152622

## (undated) DEVICE — SEE MEDLINE ITEM 157128

## (undated) DEVICE — SYR 50CC LL

## (undated) DEVICE — TRAY ENT 4/CS

## (undated) DEVICE — SUCTION COAGULATOR 10FR 6IN

## (undated) DEVICE — CATH IV INTROCAN 14G X 2.

## (undated) DEVICE — SPONGE DERMACEA 4X4IN 12PLY

## (undated) DEVICE — DRESSING SURGICAL 1X3

## (undated) DEVICE — CONTAINER SPECIMEN STRL 4OZ

## (undated) DEVICE — SEE MEDLINE ITEM 156913

## (undated) DEVICE — SUT 4-0 CHROMIC GUT / RB1

## (undated) DEVICE — BLADE SCALP OPHTL RND TIP

## (undated) DEVICE — SPONGE TONSIL LARGE

## (undated) DEVICE — BLADE INFERIOR TURBINATE 5/PK

## (undated) DEVICE — BLADE RED 40 ADENOID

## (undated) DEVICE — DRESSING TELFA STRL 4X3 LF

## (undated) DEVICE — ELECTRODE REM PLYHSV RETURN 9

## (undated) DEVICE — SUT PLAIN 4-0 SC-1 18IN

## (undated) DEVICE — CATH RED RUBBER 8FR

## (undated) DEVICE — SPONGE PATTY SURGICAL .5X3IN